# Patient Record
Sex: FEMALE | Race: WHITE | NOT HISPANIC OR LATINO | Employment: OTHER
[De-identification: names, ages, dates, MRNs, and addresses within clinical notes are randomized per-mention and may not be internally consistent; named-entity substitution may affect disease eponyms.]

---

## 2017-04-21 ENCOUNTER — SURGERY (OUTPATIENT)
Age: 67
End: 2017-04-21

## 2017-04-21 ENCOUNTER — HOSPITAL ENCOUNTER (OUTPATIENT)
Facility: CLINIC | Age: 67
Discharge: HOME | End: 2017-04-21
Attending: COLON & RECTAL SURGERY | Admitting: COLON & RECTAL SURGERY
Payer: COMMERCIAL

## 2017-04-21 VITALS
DIASTOLIC BLOOD PRESSURE: 70 MMHG | OXYGEN SATURATION: 98 % | WEIGHT: 230 LBS | HEIGHT: 64 IN | SYSTOLIC BLOOD PRESSURE: 137 MMHG | BODY MASS INDEX: 39.27 KG/M2 | RESPIRATION RATE: 6 BRPM

## 2017-04-21 LAB — COLONOSCOPY: NORMAL

## 2017-04-21 PROCEDURE — 99153 MOD SED SAME PHYS/QHP EA: CPT

## 2017-04-21 PROCEDURE — 27210582 ZZH DEVICE CLIP RESOLUTION, EACH: Performed by: COLON & RECTAL SURGERY

## 2017-04-21 PROCEDURE — 25000125 ZZHC RX 250: Performed by: COLON & RECTAL SURGERY

## 2017-04-21 PROCEDURE — 88305 TISSUE EXAM BY PATHOLOGIST: CPT | Mod: 26 | Performed by: COLON & RECTAL SURGERY

## 2017-04-21 PROCEDURE — 25000128 H RX IP 250 OP 636: Performed by: COLON & RECTAL SURGERY

## 2017-04-21 PROCEDURE — 88305 TISSUE EXAM BY PATHOLOGIST: CPT | Performed by: COLON & RECTAL SURGERY

## 2017-04-21 PROCEDURE — 45380 COLONOSCOPY AND BIOPSY: CPT | Mod: PT,XU | Performed by: COLON & RECTAL SURGERY

## 2017-04-21 PROCEDURE — 45385 COLONOSCOPY W/LESION REMOVAL: CPT | Mod: PT | Performed by: COLON & RECTAL SURGERY

## 2017-04-21 PROCEDURE — G0500 MOD SEDAT ENDO SERVICE >5YRS: HCPCS | Performed by: COLON & RECTAL SURGERY

## 2017-04-21 RX ORDER — FLUMAZENIL 0.1 MG/ML
0.2 INJECTION, SOLUTION INTRAVENOUS
Status: DISCONTINUED | OUTPATIENT
Start: 2017-04-21 | End: 2017-04-21 | Stop reason: HOSPADM

## 2017-04-21 RX ORDER — METFORMIN HCL 500 MG
500 TABLET, EXTENDED RELEASE 24 HR ORAL
COMMUNITY
End: 2023-02-13

## 2017-04-21 RX ORDER — LIDOCAINE 40 MG/G
CREAM TOPICAL
Status: DISCONTINUED | OUTPATIENT
Start: 2017-04-21 | End: 2017-04-21 | Stop reason: HOSPADM

## 2017-04-21 RX ORDER — ONDANSETRON 2 MG/ML
4 INJECTION INTRAMUSCULAR; INTRAVENOUS
Status: DISCONTINUED | OUTPATIENT
Start: 2017-04-21 | End: 2017-04-21 | Stop reason: HOSPADM

## 2017-04-21 RX ORDER — FENTANYL CITRATE 50 UG/ML
INJECTION, SOLUTION INTRAMUSCULAR; INTRAVENOUS PRN
Status: DISCONTINUED | OUTPATIENT
Start: 2017-04-21 | End: 2017-04-21 | Stop reason: HOSPADM

## 2017-04-21 RX ORDER — NALOXONE HYDROCHLORIDE 0.4 MG/ML
.1-.4 INJECTION, SOLUTION INTRAMUSCULAR; INTRAVENOUS; SUBCUTANEOUS
Status: DISCONTINUED | OUTPATIENT
Start: 2017-04-21 | End: 2017-04-21 | Stop reason: HOSPADM

## 2017-04-21 RX ORDER — ONDANSETRON 4 MG/1
4 TABLET, ORALLY DISINTEGRATING ORAL EVERY 6 HOURS PRN
Status: DISCONTINUED | OUTPATIENT
Start: 2017-04-21 | End: 2017-04-21 | Stop reason: HOSPADM

## 2017-04-21 RX ORDER — ONDANSETRON 2 MG/ML
4 INJECTION INTRAMUSCULAR; INTRAVENOUS EVERY 6 HOURS PRN
Status: DISCONTINUED | OUTPATIENT
Start: 2017-04-21 | End: 2017-04-21 | Stop reason: HOSPADM

## 2017-04-21 RX ADMIN — MIDAZOLAM HYDROCHLORIDE 1 MG: 1 INJECTION, SOLUTION INTRAMUSCULAR; INTRAVENOUS at 14:13

## 2017-04-21 RX ADMIN — FENTANYL CITRATE 100 MCG: 50 INJECTION, SOLUTION INTRAMUSCULAR; INTRAVENOUS at 13:54

## 2017-04-21 RX ADMIN — MIDAZOLAM HYDROCHLORIDE 2 MG: 1 INJECTION, SOLUTION INTRAMUSCULAR; INTRAVENOUS at 13:54

## 2017-04-21 RX ADMIN — FENTANYL CITRATE 50 MCG: 50 INJECTION, SOLUTION INTRAMUSCULAR; INTRAVENOUS at 14:13

## 2017-04-21 RX ADMIN — MIDAZOLAM HYDROCHLORIDE 1 MG: 1 INJECTION, SOLUTION INTRAMUSCULAR; INTRAVENOUS at 14:00

## 2017-04-21 NOTE — H&P
"Pre-Endoscopy History and Physical     Micki Mathew MRN# [id number]   YOB: 1950 Age: 67 year old     Date of Procedure: 4/21/2017  Primary care provider: Shania Vann  Type of Endoscopy: Colonoscopy  Reason for Procedure: History of polyps  Type of Anesthesia Anticipated: Moderate Sedation    HPI:    Micki is a 67 year old female who will be undergoing the above procedure.      A history and physical has been performed. The patient's medications and allergies have been reviewed. The risks and benefits of the procedure and the sedation options and risks were discussed with the patient.  All questions were answered and informed consent was obtained.      She denies a personal or family history of anesthesia complications or bleeding disorders.     No Known Allergies       No current facility-administered medications on file prior to encounter.   No current outpatient prescriptions on file prior to encounter.    There is no problem list on file for this patient.       Past Medical History:   Diagnosis Date     Atrial fibrillation (H)      Diabetes (H)     MD type 2     Thyroid disease         Past Surgical History:   Procedure Laterality Date     ENT SURGERY      benign lump throat     ENT SURGERY      tonsils     GYN SURGERY      c section     ORTHOPEDIC SURGERY      left hip replacement       Social History   Substance Use Topics     Smoking status: Never Smoker     Smokeless tobacco: Not on file     Alcohol use No       History reviewed. No pertinent family history.    REVIEW OF SYSTEMS:     5 point ROS negative except as noted above in HPI, including Gen., Resp., CV, GI &  system review.      PHYSICAL EXAM:   BP (!) 177/92  Resp 16  Ht 1.626 m (5' 4\")  Wt 104.3 kg (230 lb)  SpO2 97%  BMI 39.48 kg/m2 Estimated body mass index is 39.48 kg/(m^2) as calculated from the following:    Height as of this encounter: 1.626 m (5' 4\").    Weight as of this encounter: 104.3 kg (230 lb).   GENERAL " APPEARANCE: healthy and alert  MENTAL STATUS: alert  RESP: lungs clear to auscultation - no rales, rhonchi or wheezes  CV: regular rates and rhythm and normal S1 S2, no S3 or S4      IMPRESSION   ASA Class 2 - Mild systemic disease        PLAN:     Plan for colonoscopy. We discussed the risks, benefits and alternatives and the patient wished to proceed.    The above has been forwarded to the consulting provider.      Peng Harding MD  [facility]  Phone: [phone]  April 21, 2017  3

## 2017-04-24 LAB — COPATH REPORT: NORMAL

## 2018-08-16 ENCOUNTER — SURGERY (OUTPATIENT)
Age: 68
End: 2018-08-16

## 2018-08-16 ENCOUNTER — HOSPITAL ENCOUNTER (OUTPATIENT)
Facility: CLINIC | Age: 68
Discharge: HOME | End: 2018-08-16
Attending: COLON & RECTAL SURGERY | Admitting: COLON & RECTAL SURGERY
Payer: MEDICARE

## 2018-08-16 VITALS
SYSTOLIC BLOOD PRESSURE: 169 MMHG | BODY MASS INDEX: 39.15 KG/M2 | RESPIRATION RATE: 18 BRPM | HEART RATE: 65 BPM | WEIGHT: 235 LBS | DIASTOLIC BLOOD PRESSURE: 90 MMHG | OXYGEN SATURATION: 94 % | HEIGHT: 65 IN

## 2018-08-16 DIAGNOSIS — I48.0 PAROXYSMAL ATRIAL FIBRILLATION (H): Primary | ICD-10-CM

## 2018-08-16 LAB — COLONOSCOPY: NORMAL

## 2018-08-16 PROCEDURE — G0500 MOD SEDAT ENDO SERVICE >5YRS: HCPCS | Performed by: COLON & RECTAL SURGERY

## 2018-08-16 PROCEDURE — 25000128 H RX IP 250 OP 636: Performed by: COLON & RECTAL SURGERY

## 2018-08-16 PROCEDURE — 88305 TISSUE EXAM BY PATHOLOGIST: CPT | Performed by: COLON & RECTAL SURGERY

## 2018-08-16 PROCEDURE — 45385 COLONOSCOPY W/LESION REMOVAL: CPT | Performed by: COLON & RECTAL SURGERY

## 2018-08-16 PROCEDURE — 45380 COLONOSCOPY AND BIOPSY: CPT | Performed by: COLON & RECTAL SURGERY

## 2018-08-16 PROCEDURE — 99153 MOD SED SAME PHYS/QHP EA: CPT | Performed by: COLON & RECTAL SURGERY

## 2018-08-16 PROCEDURE — 88305 TISSUE EXAM BY PATHOLOGIST: CPT | Mod: 26 | Performed by: COLON & RECTAL SURGERY

## 2018-08-16 RX ORDER — SODIUM PHOSPHATE,MONO-DIBASIC 19G-7G/118
1 ENEMA (ML) RECTAL DAILY
COMMUNITY

## 2018-08-16 RX ORDER — ONDANSETRON 2 MG/ML
4 INJECTION INTRAMUSCULAR; INTRAVENOUS
Status: DISCONTINUED | OUTPATIENT
Start: 2018-08-16 | End: 2018-08-16 | Stop reason: HOSPADM

## 2018-08-16 RX ORDER — MULTIVITAMIN WITH IRON
1 TABLET ORAL DAILY
COMMUNITY

## 2018-08-16 RX ORDER — LIDOCAINE 40 MG/G
CREAM TOPICAL
Status: DISCONTINUED | OUTPATIENT
Start: 2018-08-16 | End: 2018-08-16 | Stop reason: HOSPADM

## 2018-08-16 RX ORDER — FLUMAZENIL 0.1 MG/ML
0.2 INJECTION, SOLUTION INTRAVENOUS
Status: CANCELLED | OUTPATIENT
Start: 2018-08-16 | End: 2018-08-16

## 2018-08-16 RX ORDER — FENTANYL CITRATE 50 UG/ML
INJECTION, SOLUTION INTRAMUSCULAR; INTRAVENOUS PRN
Status: DISCONTINUED | OUTPATIENT
Start: 2018-08-16 | End: 2018-08-16 | Stop reason: HOSPADM

## 2018-08-16 RX ORDER — NALOXONE HYDROCHLORIDE 0.4 MG/ML
.1-.4 INJECTION, SOLUTION INTRAMUSCULAR; INTRAVENOUS; SUBCUTANEOUS
Status: CANCELLED | OUTPATIENT
Start: 2018-08-16 | End: 2018-08-17

## 2018-08-16 RX ORDER — ONDANSETRON 4 MG/1
4 TABLET, ORALLY DISINTEGRATING ORAL EVERY 6 HOURS PRN
Status: CANCELLED | OUTPATIENT
Start: 2018-08-16

## 2018-08-16 RX ORDER — ONDANSETRON 2 MG/ML
4 INJECTION INTRAMUSCULAR; INTRAVENOUS EVERY 6 HOURS PRN
Status: CANCELLED | OUTPATIENT
Start: 2018-08-16

## 2018-08-16 RX ORDER — MULTIPLE VITAMINS W/ MINERALS TAB 9MG-400MCG
1 TAB ORAL DAILY
COMMUNITY

## 2018-08-16 RX ORDER — CYANOCOBALAMIN (VITAMIN B-12) 500 MCG
400 LOZENGE ORAL DAILY
COMMUNITY

## 2018-08-16 RX ADMIN — MIDAZOLAM 1 MG: 1 INJECTION INTRAMUSCULAR; INTRAVENOUS at 10:26

## 2018-08-16 RX ADMIN — FENTANYL CITRATE 100 MCG: 50 INJECTION, SOLUTION INTRAMUSCULAR; INTRAVENOUS at 10:12

## 2018-08-16 RX ADMIN — FENTANYL CITRATE 50 MCG: 50 INJECTION, SOLUTION INTRAMUSCULAR; INTRAVENOUS at 10:28

## 2018-08-16 RX ADMIN — MIDAZOLAM 1 MG: 1 INJECTION INTRAMUSCULAR; INTRAVENOUS at 10:37

## 2018-08-16 RX ADMIN — FENTANYL CITRATE 50 MCG: 50 INJECTION, SOLUTION INTRAMUSCULAR; INTRAVENOUS at 10:39

## 2018-08-16 RX ADMIN — MIDAZOLAM 2 MG: 1 INJECTION INTRAMUSCULAR; INTRAVENOUS at 10:13

## 2018-08-16 NOTE — H&P
Pre-Endoscopy History and Physical     Micki Mathew MRN# [id number]   YOB: 1950 Age: 68 year old     Date of Procedure: 8/16/2018  Primary care provider: Shania Vann  Type of Endoscopy: Colonoscopy  Reason for Procedure: History of polyps  Type of Anesthesia Anticipated: Moderate Sedation    HPI:    Micki is a 68 year old female who will be undergoing the above procedure.      A history and physical has been performed. The patient's medications and allergies have been reviewed. The risks and benefits of the procedure and the sedation options and risks were discussed with the patient.  All questions were answered and informed consent was obtained.      She denies a personal or family history of anesthesia complications or bleeding disorders.     No Known Allergies       No current facility-administered medications on file prior to encounter.   Current Outpatient Prescriptions on File Prior to Encounter:  metFORMIN (GLUCOPHAGE-XR) 500 MG 24 hr tablet Take 500 mg by mouth daily (with dinner)   Rivaroxaban (XARELTO PO) Take 20 mg by mouth       There is no problem list on file for this patient.       Past Medical History:   Diagnosis Date     Atrial fibrillation (H)      Diabetes (H)     MD type 2     Thyroid disease         Past Surgical History:   Procedure Laterality Date     COLONOSCOPY N/A 4/21/2017    Procedure: COMBINED COLONOSCOPY, SINGLE OR MULTIPLE BIOPSY/POLYPECTOMY BY BIOPSY;;  Surgeon: Peng Harding MD;  Location:  GI     ENT SURGERY      benign lump throat     ENT SURGERY      tonsils     GYN SURGERY      c section     ORTHOPEDIC SURGERY      left hip replacement       Social History   Substance Use Topics     Smoking status: Never Smoker     Smokeless tobacco: Not on file     Alcohol use No       No family history on file.    REVIEW OF SYSTEMS:     5 point ROS negative except as noted above in HPI, including Gen., Resp., CV, GI &  system review.      PHYSICAL EXAM:   There  "were no vitals taken for this visit. Estimated body mass index is 39.48 kg/(m^2) as calculated from the following:    Height as of 4/21/17: 1.626 m (5' 4\").    Weight as of 4/21/17: 104.3 kg (230 lb).   GENERAL APPEARANCE: healthy and alert  MENTAL STATUS: alert  RESP: lungs clear to auscultation - no rales, rhonchi or wheezes  CV: regular rates and rhythm and normal S1 S2, no S3 or S4      IMPRESSION   ASA Class 2 - Mild systemic disease        PLAN:     Plan for colonoscopy. We discussed the risks, benefits and alternatives and the patient wished to proceed.    The above has been forwarded to the consulting provider.      Peng Harding MD  [facility]  Phone: [phone]  August 16, 2018    "

## 2018-08-17 LAB — COPATH REPORT: NORMAL

## 2019-05-29 ENCOUNTER — HOSPITAL ENCOUNTER (OUTPATIENT)
Facility: CLINIC | Age: 69
End: 2019-05-29
Attending: COLON & RECTAL SURGERY | Admitting: COLON & RECTAL SURGERY

## 2019-07-16 RX ORDER — ONDANSETRON 2 MG/ML
4 INJECTION INTRAMUSCULAR; INTRAVENOUS
Status: CANCELLED | OUTPATIENT
Start: 2019-07-16

## 2019-07-16 RX ORDER — LIDOCAINE 40 MG/G
CREAM TOPICAL
Status: CANCELLED | OUTPATIENT
Start: 2019-07-16

## 2023-02-13 ENCOUNTER — VIRTUAL VISIT (OUTPATIENT)
Dept: FAMILY MEDICINE | Facility: CLINIC | Age: 73
End: 2023-02-13
Payer: COMMERCIAL

## 2023-02-13 DIAGNOSIS — R06.2 WHEEZING: ICD-10-CM

## 2023-02-13 DIAGNOSIS — J20.9 ACUTE BRONCHITIS, UNSPECIFIED ORGANISM: Primary | ICD-10-CM

## 2023-02-13 PROCEDURE — 99203 OFFICE O/P NEW LOW 30 MIN: CPT | Mod: 95 | Performed by: INTERNAL MEDICINE

## 2023-02-13 RX ORDER — PREDNISONE 20 MG/1
40 TABLET ORAL DAILY
Qty: 10 TABLET | Refills: 0 | Status: SHIPPED | OUTPATIENT
Start: 2023-02-13 | End: 2023-02-18

## 2023-02-13 NOTE — PROGRESS NOTES
Micki is a 73 year old who is being evaluated via a billable telephone visit.      What phone number would you like to be contacted at? [phone]   How would you like to obtain your AVS? Mail a copy    Distant Location (provider location):  Off-site    Assessment & Plan     Acute bronchitis, unspecified organism  Has been having ongoing cough for the last 2 weeks  Initially started with some cold symptoms  Now having cough  Coughing all the time  Bringing up yellow sputum  Also feels congested  Having nasal drainage  Which is yellow in color  Also having some ear pain  No body aches  She had bronchitis in the past and feels like one  - amoxicillin-clavulanate (AUGMENTIN) 875-125 MG tablet; Take 1 tablet by mouth 2 times daily    Wheezing   she does have some wheezing increasing shortness  I think she is bronchospastic  Try a short course of steroids  - predniSONE (DELTASONE) 20 MG tablet; Take 2 tablets (40 mg) by mouth daily for 5 days      30 minutes spent on the date of the encounter doing chart review, history and exam, documentation and further activities per the note           Return in about 4 weeks (around 3/13/2023), or if symptoms worsen or fail to improve.    Mario Flores MD  [facility]    Grazyna Sweet is a 73 year old, presenting for the following health issues:  Cough      History of Present Illness       Reason for visit:  COUGH  Symptom onset:  1-2 weeks ago  Symptoms include:  Cough, chest pain, sore throat, ear pain, runny nose, watery eyes, headache, tired  Symptom intensity:  Severe  Symptom progression:  Staying the same  Had these symptoms before:  Yes  Has tried/received treatment for these symptoms:  Yes  Previous treatment was successful:  Yes  Prior treatment description:  Antibotics  What makes it worse:  None  What makes it better:  Drink fluids, rest    She eats 0-1 servings of fruits and vegetables daily.She consumes 0 sweetened beverage(s)  daily.She exercises with enough effort to increase her heart rate 9 or less minutes per day.  She exercises with enough effort to increase her heart rate 3 or less days per week.   She is taking medications regularly.             Review of Systems   Constitutional, HEENT, cardiovascular, pulmonary, gi and gu systems are negative, except as otherwise noted.      Objective           Vitals:  No vitals were obtained today due to virtual visit.    Physical Exam   healthy, alert and no distress  PSYCH: Alert and oriented times 3; coherent speech, normal   rate and volume, able to articulate logical thoughts, able   to abstract reason, no tangential thoughts, no hallucinations   or delusions  Her affect is normal  RESP: No cough, no audible wheezing, able to talk in full sentences  Remainder of exam unable to be completed due to telephone visits                Phone call duration: 13 minutes

## 2023-04-04 ENCOUNTER — APPOINTMENT (OUTPATIENT)
Dept: CT IMAGING | Facility: CLINIC | Age: 73
End: 2023-04-04
Attending: NURSE PRACTITIONER
Payer: COMMERCIAL

## 2023-04-04 ENCOUNTER — HOSPITAL ENCOUNTER (OUTPATIENT)
Facility: CLINIC | Age: 73
Setting detail: OBSERVATION
Discharge: HOME | End: 2023-04-05
Attending: NURSE PRACTITIONER | Admitting: INTERNAL MEDICINE
Payer: COMMERCIAL

## 2023-04-04 DIAGNOSIS — S09.90XA HEAD INJURY, INITIAL ENCOUNTER: ICD-10-CM

## 2023-04-04 DIAGNOSIS — Z79.01 LONG TERM (CURRENT) USE OF ANTICOAGULANTS: ICD-10-CM

## 2023-04-04 DIAGNOSIS — I60.9 SUBARACHNOID HEMORRHAGE (H): ICD-10-CM

## 2023-04-04 DIAGNOSIS — S09.93XA FACIAL TRAUMA, INITIAL ENCOUNTER: ICD-10-CM

## 2023-04-04 DIAGNOSIS — S06.6X0A SUBARACHNOID HEMORRHAGE FOLLOWING INJURY, NO LOSS OF CONSCIOUSNESS, INITIAL ENCOUNTER (H): ICD-10-CM

## 2023-04-04 DIAGNOSIS — I48.0 PAROXYSMAL ATRIAL FIBRILLATION (H): ICD-10-CM

## 2023-04-04 DIAGNOSIS — R04.0 EPISTAXIS: ICD-10-CM
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
## 2023-04-04 LAB
ANION GAP SERPL CALCULATED.3IONS-SCNC: 13 MMOL/L (ref 7–15)
BASOPHILS # BLD AUTO: 0.1 10E3/UL (ref 0–0.2)
BASOPHILS NFR BLD AUTO: 1 %
BUN SERPL-MCNC: 19.5 MG/DL (ref 8–23)
CALCIUM SERPL-MCNC: 10.2 MG/DL (ref 8.8–10.2)
CHLORIDE SERPL-SCNC: 97 MMOL/L (ref 98–107)
CREAT SERPL-MCNC: 0.97 MG/DL (ref 0.51–0.95)
DEPRECATED HCO3 PLAS-SCNC: 29 MMOL/L (ref 22–29)
EOSINOPHIL # BLD AUTO: 0.1 10E3/UL (ref 0–0.7)
EOSINOPHIL NFR BLD AUTO: 1 %
ERYTHROCYTE [DISTWIDTH] IN BLOOD BY AUTOMATED COUNT: 13 % (ref 10–15)
GFR SERPL CREATININE-BSD FRML MDRD: 61 ML/MIN/1.73M2
GLUCOSE SERPL-MCNC: 113 MG/DL (ref 70–99)
HCT VFR BLD AUTO: 41.6 % (ref 35–47)
HGB BLD-MCNC: 13.8 G/DL (ref 11.7–15.7)
HOLD SPECIMEN: NORMAL
IMM GRANULOCYTES # BLD: 0.1 10E3/UL
IMM GRANULOCYTES NFR BLD: 0 %
INR PPP: 1.17 (ref 0.85–1.15)
LYMPHOCYTES # BLD AUTO: 1.3 10E3/UL (ref 0.8–5.3)
LYMPHOCYTES NFR BLD AUTO: 10 %
MCH RBC QN AUTO: 30.6 PG (ref 26.5–33)
MCHC RBC AUTO-ENTMCNC: 33.2 G/DL (ref 31.5–36.5)
MCV RBC AUTO: 92 FL (ref 78–100)
MONOCYTES # BLD AUTO: 1 10E3/UL (ref 0–1.3)
MONOCYTES NFR BLD AUTO: 8 %
NEUTROPHILS # BLD AUTO: 10.1 10E3/UL (ref 1.6–8.3)
NEUTROPHILS NFR BLD AUTO: 80 %
NRBC # BLD AUTO: 0 10E3/UL
NRBC BLD AUTO-RTO: 0 /100
PLATELET # BLD AUTO: 240 10E3/UL (ref 150–450)
POTASSIUM SERPL-SCNC: 3.3 MMOL/L (ref 3.4–5.3)
RBC # BLD AUTO: 4.51 10E6/UL (ref 3.8–5.2)
SODIUM SERPL-SCNC: 139 MMOL/L (ref 136–145)
WBC # BLD AUTO: 12.5 10E3/UL (ref 4–11)

## 2023-04-04 PROCEDURE — 99285 EMERGENCY DEPT VISIT HI MDM: CPT | Performed by: NURSE PRACTITIONER

## 2023-04-04 PROCEDURE — 250N000013 HC RX MED GY IP 250 OP 250 PS 637: Performed by: NURSE PRACTITIONER

## 2023-04-04 PROCEDURE — 80048 BASIC METABOLIC PNL TOTAL CA: CPT | Performed by: NURSE PRACTITIONER

## 2023-04-04 PROCEDURE — 99285 EMERGENCY DEPT VISIT HI MDM: CPT | Mod: 25 | Performed by: NURSE PRACTITIONER

## 2023-04-04 PROCEDURE — 85025 COMPLETE CBC W/AUTO DIFF WBC: CPT | Performed by: NURSE PRACTITIONER

## 2023-04-04 PROCEDURE — 99222 1ST HOSP IP/OBS MODERATE 55: CPT | Mod: AI | Performed by: INTERNAL MEDICINE

## 2023-04-04 PROCEDURE — 36415 COLL VENOUS BLD VENIPUNCTURE: CPT | Performed by: NURSE PRACTITIONER

## 2023-04-04 PROCEDURE — 70486 CT MAXILLOFACIAL W/O DYE: CPT

## 2023-04-04 PROCEDURE — G0378 HOSPITAL OBSERVATION PER HR: HCPCS

## 2023-04-04 PROCEDURE — 250N000013 HC RX MED GY IP 250 OP 250 PS 637: Performed by: INTERNAL MEDICINE

## 2023-04-04 PROCEDURE — 85610 PROTHROMBIN TIME: CPT | Performed by: NURSE PRACTITIONER

## 2023-04-04 PROCEDURE — 70450 CT HEAD/BRAIN W/O DYE: CPT | Mod: 76

## 2023-04-04 PROCEDURE — 70450 CT HEAD/BRAIN W/O DYE: CPT

## 2023-04-04 RX ORDER — OXYCODONE HYDROCHLORIDE 5 MG/1
5 TABLET ORAL EVERY 4 HOURS PRN
Status: DISCONTINUED | OUTPATIENT
Start: 2023-04-04 | End: 2023-04-05 | Stop reason: HOSPADM

## 2023-04-04 RX ORDER — ATORVASTATIN CALCIUM 20 MG/1
20 TABLET, FILM COATED ORAL DAILY
Status: DISCONTINUED | OUTPATIENT
Start: 2023-04-05 | End: 2023-04-05 | Stop reason: HOSPADM

## 2023-04-04 RX ORDER — ACETAMINOPHEN 650 MG/1
650 SUPPOSITORY RECTAL EVERY 4 HOURS PRN
Status: DISCONTINUED | OUTPATIENT
Start: 2023-04-04 | End: 2023-04-05 | Stop reason: HOSPADM

## 2023-04-04 RX ORDER — HYDROCHLOROTHIAZIDE 25 MG/1
1 TABLET ORAL
COMMUNITY
Start: 2023-01-31

## 2023-04-04 RX ORDER — ASPIRIN 81 MG/1
81 TABLET ORAL DAILY
Status: DISCONTINUED | OUTPATIENT
Start: 2023-04-05 | End: 2023-04-05 | Stop reason: HOSPADM

## 2023-04-04 RX ORDER — LEVOTHYROXINE SODIUM 75 UG/1
75 TABLET ORAL DAILY
COMMUNITY
Start: 2023-01-31

## 2023-04-04 RX ORDER — PROCHLORPERAZINE 25 MG
12.5 SUPPOSITORY, RECTAL RECTAL EVERY 12 HOURS PRN
Status: DISCONTINUED | OUTPATIENT
Start: 2023-04-04 | End: 2023-04-05 | Stop reason: HOSPADM

## 2023-04-04 RX ORDER — ACETAMINOPHEN 500 MG
1000 TABLET ORAL ONCE
Status: COMPLETED | OUTPATIENT
Start: 2023-04-04 | End: 2023-04-04

## 2023-04-04 RX ORDER — OXYCODONE HYDROCHLORIDE 5 MG/1
5 TABLET ORAL ONCE
Status: COMPLETED | OUTPATIENT
Start: 2023-04-04 | End: 2023-04-04

## 2023-04-04 RX ORDER — FAMOTIDINE 20 MG/1
20 TABLET, FILM COATED ORAL 2 TIMES DAILY
Status: DISCONTINUED | OUTPATIENT
Start: 2023-04-04 | End: 2023-04-05 | Stop reason: HOSPADM

## 2023-04-04 RX ORDER — METOPROLOL TARTRATE 25 MG/1
25 TABLET, FILM COATED ORAL 2 TIMES DAILY
COMMUNITY

## 2023-04-04 RX ORDER — ATORVASTATIN CALCIUM 20 MG/1
20 TABLET, FILM COATED ORAL DAILY
COMMUNITY
Start: 2023-01-31 | End: 2024-01-31

## 2023-04-04 RX ORDER — HYDROCHLOROTHIAZIDE 25 MG/1
25 TABLET ORAL
Status: DISCONTINUED | OUTPATIENT
Start: 2023-04-04 | End: 2023-04-05 | Stop reason: HOSPADM

## 2023-04-04 RX ORDER — METOPROLOL TARTRATE 50 MG
50 TABLET ORAL 2 TIMES DAILY
COMMUNITY
Start: 2023-01-31

## 2023-04-04 RX ORDER — PROCHLORPERAZINE MALEATE 5 MG
5 TABLET ORAL EVERY 6 HOURS PRN
Status: DISCONTINUED | OUTPATIENT
Start: 2023-04-04 | End: 2023-04-05 | Stop reason: HOSPADM

## 2023-04-04 RX ORDER — METOPROLOL TARTRATE 25 MG/1
75 TABLET, FILM COATED ORAL 2 TIMES DAILY
Status: DISCONTINUED | OUTPATIENT
Start: 2023-04-04 | End: 2023-04-05 | Stop reason: HOSPADM

## 2023-04-04 RX ORDER — LEVOTHYROXINE SODIUM 75 UG/1
75 TABLET ORAL DAILY
Status: DISCONTINUED | OUTPATIENT
Start: 2023-04-05 | End: 2023-04-05 | Stop reason: HOSPADM

## 2023-04-04 RX ADMIN — METOPROLOL TARTRATE 75 MG: 25 TABLET, FILM COATED ORAL at 21:13

## 2023-04-04 RX ADMIN — OXYCODONE 5 MG: 5 TABLET ORAL at 21:24

## 2023-04-04 RX ADMIN — HYDROCHLOROTHIAZIDE 25 MG: 25 TABLET ORAL at 21:14

## 2023-04-04 RX ADMIN — ACETAMINOPHEN 1000 MG: 500 TABLET ORAL at 12:32

## 2023-04-04 RX ADMIN — SERTRALINE HYDROCHLORIDE 50 MG: 50 TABLET ORAL at 21:14

## 2023-04-04 RX ADMIN — OXYCODONE 5 MG: 5 TABLET ORAL at 12:32

## 2023-04-04 ASSESSMENT — ACTIVITIES OF DAILY LIVING (ADL)
ADLS_ACUITY_SCORE: 35
ADLS_ACUITY_SCORE: 31
ADLS_ACUITY_SCORE: 35
ADLS_ACUITY_SCORE: 31
ADLS_ACUITY_SCORE: 35
ADLS_ACUITY_SCORE: 31

## 2023-04-04 NOTE — ED NOTES
"[facility]   Admission Handoff    The patient is Micki Mathew, 73 year old who arrived in the ED by AMBULANCE from home with a complaint of Fall  . The patient's current symptoms are new and during this time the symptoms have remained the same. In the ED, patient was diagnosed with   Final diagnoses:   Facial trauma, initial encounter   Epistaxis   Subarachnoid hemorrhage (H)   Head injury, initial encounter         Needed?: No    Allergies:    Allergies   Allergen Reactions    Lisinopril Cough       Past Medical Hx:   Past Medical History:   Diagnosis Date    Atrial fibrillation (H)     Diabetes (H)     MD type 2    Thyroid disease        Initial vitals were: BP: (!) 159/93  Pulse: 65  Temp: 97.3  F (36.3  C)  Resp: 16  Height: 162.6 cm (5' 4\")  Weight: 104.3 kg (230 lb)  SpO2: 98 %   Recent vital Signs: BP (!) 161/86   Pulse 59   Temp 97.3  F (36.3  C) (Oral)   Resp 16   Ht 1.626 m (5' 4\")   Wt 104.3 kg (230 lb)   SpO2 96%   BMI 39.48 kg/m      Elimination Status: Continent: Yes     Activity Level: SBA    Fall Status: Reason for falls risk: Other- Here for fall.   bed/chair alarm on, nonskid shoes/slippers when out of bed, arm band in place, patient and family education, assistive device/personal items within reach, and activity supervised    Baseline Mental status: WDL  Current Mental Status changes: at basesline    Infection present or suspected this encounter: no  Sepsis suspected: No    Isolation type: NA    Bariatric equipment needed?: No    In the ED these meds were given:   Medications   famotidine (PEPCID) tablet 20 mg (has no administration in time range)   acetaminophen (TYLENOL) Suppository 650 mg (has no administration in time range)   prochlorperazine (COMPAZINE) injection 5 mg (has no administration in time range)     Or   prochlorperazine (COMPAZINE) tablet 5 mg (has no administration in time range)     Or   prochlorperazine (COMPAZINE) suppository " 12.5 mg (has no administration in time range)   acetaminophen (TYLENOL) tablet 1,000 mg (1,000 mg Oral $Given 4/4/23 1232)   oxyCODONE (ROXICODONE) tablet 5 mg (5 mg Oral $Given 4/4/23 1232)       Drips running?  No    Home pump  No    Current LDAs: None. Pt refused.     Results:   Labs/Imaging  Ordered and Resulted from Time of ED Arrival Up to the Time of Departure from the ED  Results for orders placed or performed during the hospital encounter of 04/04/23 (from the past 24 hour(s))   CBC with platelets differential    Narrative    The following orders were created for panel order CBC with platelets differential.  Procedure                               Abnormality         Status                     ---------                               -----------         ------                     CBC with platelets and d...[[id number]]  Abnormal            Final result                 Please view results for these tests on the individual orders.   INR   Result Value Ref Range    INR 1.17 (H) 0.85 - 1.15   Basic metabolic panel   Result Value Ref Range    Sodium 139 136 - 145 mmol/L    Potassium 3.3 (L) 3.4 - 5.3 mmol/L    Chloride 97 (L) 98 - 107 mmol/L    Carbon Dioxide (CO2) 29 22 - 29 mmol/L    Anion Gap 13 7 - 15 mmol/L    Urea Nitrogen 19.5 8.0 - 23.0 mg/dL    Creatinine 0.97 (H) 0.51 - 0.95 mg/dL    Calcium 10.2 8.8 - 10.2 mg/dL    Glucose 113 (H) 70 - 99 mg/dL    GFR Estimate 61 >60 mL/min/1.73m2   CBC with platelets and differential   Result Value Ref Range    WBC Count 12.5 (H) 4.0 - 11.0 10e3/uL    RBC Count 4.51 3.80 - 5.20 10e6/uL    Hemoglobin 13.8 11.7 - 15.7 g/dL    Hematocrit 41.6 35.0 - 47.0 %    MCV 92 78 - 100 fL    MCH 30.6 26.5 - 33.0 pg    MCHC 33.2 31.5 - 36.5 g/dL    RDW 13.0 10.0 - 15.0 %    Platelet Count 240 150 - 450 10e3/uL    % Neutrophils 80 %    % Lymphocytes 10 %    % Monocytes 8 %    % Eosinophils 1 %    % Basophils 1 %    % Immature Granulocytes 0 %    NRBCs per 100 WBC 0 <1 /100    Absolute  Neutrophils 10.1 (H) 1.6 - 8.3 10e3/uL    Absolute Lymphocytes 1.3 0.8 - 5.3 10e3/uL    Absolute Monocytes 1.0 0.0 - 1.3 10e3/uL    Absolute Eosinophils 0.1 0.0 - 0.7 10e3/uL    Absolute Basophils 0.1 0.0 - 0.2 10e3/uL    Absolute Immature Granulocytes 0.1 <=0.4 10e3/uL    Absolute NRBCs 0.0 10e3/uL   Extra Tube    Narrative    The following orders were created for panel order Extra Tube.  Procedure                               Abnormality         Status                     ---------                               -----------         ------                     Extra Red Top Tube[[id number]]                               Final result                 Please view results for these tests on the individual orders.   Extra Red Top Tube   Result Value Ref Range    Hold Specimen JIC    CT Head w/o Contrast    Narrative    CT SCAN OF THE HEAD WITHOUT CONTRAST April 4, 2023 12:59 PM     HISTORY: Fall, on Xarelto.    TECHNIQUE: Axial images of the head and coronal reformations without  IV contrast material. Radiation dose for this scan was reduced using  automated exposure control, adjustment of the mA and/or kV according  to patient size, or iterative reconstruction technique.    COMPARISON: None.    FINDINGS: Subtle sulcal hyperattenuation involving the left precentral  sulcus (series 2 image 20). Punctate area of hyperattenuation within  or along the left sylvian fissure (series 2 image 9).    Well-defined right parietal lobe infarct, age indeterminate. Smaller  right cerebellar infarct, age indeterminate. Volume loss and patchy  nonspecific white matter hypoattenuation which likely represents  chronic small vessel ischemic change. Acute nasal bone fracture. Refer  to maxillofacial CT report. Air-fluid levels within the paranasal  sinuses.      Impression    IMPRESSION:   1. Subtle sulcal hyperattenuation involving the left precentral sulcus  which could represent subtle acute subarachnoid. Recommend follow-up  head CT.  2.  Punctate area of hyperattenuation within the left sylvian fissure  (series 2 image 9). Subtle subarachnoid hemorrhage not completely  excluded. Recommend attention on follow-up head CT.  3. Infarcts involving the right parietal lobe and right cerebellar  hemisphere, age indeterminate (MRI could be performed).  4. Acute nasal bone fracture. Refer to maxillofacial CT report.    SUELLEN REYES MD         SYSTEM ID:  [id number]   CT Facial Bones without Contrast    Narrative    CT FACIAL BONES WITHOUT CONTRAST 4/4/2023 1:01 PM     HISTORY: Fall, hit face, on Xarelto; rule out orbital/sinus fracture,  suspect nasal fracture.    TECHNIQUE: Axial CT images of the facial bones were completed with  sagittal and coronal reformations. Radiation dose for this scan was  reduced using automated exposure control, adjustment of the mA and/or  kV according to patient size, or iterative reconstruction technique.     COMPARISON: None.      Impression    IMPRESSION: Acute fractures involving the nasal bone and nasal septum.  No other facial fractures are identified. Bony orbits are intact.  Hemorrhage layering within the bilateral paranasal sinuses. Aerated  secretions/fluid within the nasal cavity and nasopharynx.  Frontal/nasal soft tissue contusion. Dental disease.    SUELLEN REYES MD         SYSTEM ID:  [id number]       For the majority of the shift this patient's behavior was Green     Cardiac Rhythm: N/A  Pt needs tele? No  Skin/wound Issues:  laceration to bridge of the nose and nasal fx.     Code Status: None listed at this time.     Pain control: Good.     Nausea control: pt had none    Abnormal labs/tests/findings requiring intervention: See imaging. Neuros Q2H x 4.     Patient tested for COVID 19 prior to admission: NO     OBS brochure/video discussed/provided to patient/family: Yes     Family present during ED course? No     Family Comments/Social Situation comments: NA    Tasks needing completion: None    Rosalind  Marta, RN

## 2023-04-04 NOTE — H&P
[facility]    History and Physical  Hospital Medicine       Date of Admission:  4/4/2023  Date of Service: 4/4/2023     Assessment & Plan   Micki Mathew is a 73 year old female with past medical history significant for atrial fibrillation, multiple tiny strokes, hypertension, and hypothyroidism who now presents on 4/4/2023 with facial trauma after trip and fall.    Head injury, initial encounter  Nasal fractures  Epistaxis secondary to nasal fractures    Patient tripped on an elevated sidewalk square and landed hitting her nose.  She had epistaxis and was found to have nasal fractures.  Epistaxis eventually resolved after nose pinching.  Last rivaroxaban was evening of the day before admission.   - Hold anticoagulation until it is clear epistaxis is resolved   - We will continue with low-dose aspirin   - Consider outpatient ENT eval for the nasal fractures    Concern for small traumatic subarachnoid hemorrhage on first head CT  Closed head injury    Repeat head CT after 6 hours showed no evidence of subarachnoid hemorrhage.  Clinically little symptoms to suggest concussion except possibly some irritability.   - Follow-up overnight for stability.  If doing well anticipate discharge home tomorrow    History of strokes  Patient reports history of small strokes that were symptomatic occurring at times when anticoagulation was held or reduced.  Patient is very adamant that she not be off her anticoagulation for very long as she believes she is at risk of recurrent stroke off the anticoagulation.      Clinically Significant Risk Factors Present on Admission        # Hypokalemia: Lowest K = 3.3 mmol/L in last 2 days, will replace as needed    # Hypercalcemia: Highest Ca = 10.2 mg/dL in last 2 days, will monitor as appropriate     # Drug Induced Coagulation Defect: home medication list includes an anticoagulant medication         # Obesity: Estimated body mass index is 39.48 kg/m  as  "calculated from the following:    Height as of this encounter: 1.626 m (5' 4\").    Weight as of this encounter: 104.3 kg (230 lb).             Diet: Regular Diet Adult    DVT Prophylaxis: Low Risk/Ambulatory with no VTE prophylaxis indicated  Jolly Catheter: Not present  Lines: None     Code Status: Full Code discussed with patient         Disposition: Anticipate discharge in 1 day(s) once remained stable. Appropriate for observation care    Braydon Cristobal MD  Hospital Medicine        Primary Care Physician   Milo Lugo [phone]    History is obtained from the patient was a Sindy Darden, family at bedside, discussion with ER provider and review of old records via the EMR.    History of Present Illness   Micki Mathew is a 73 year old female who presents with facial trauma after trip and fall.  She was in her normal state of health and there was a raised edge of a sidewalk of an inch and she tripped.  She struck her nose/face and had subsequent epistaxis.  Denies neck pain.  She hit her left knee and may have injured her right wrist though after other evaluations no injuries found.  Denies hallucinations or nausea.  Denies frequent falls.    Past Medical History    Past Medical History:   Diagnosis Date     Atrial fibrillation (H)      Diabetes (H)     MD type 2     Thyroid disease        Past Surgical History   Past Surgical History:   Procedure Laterality Date     COLONOSCOPY N/A 4/21/2017    Procedure: COMBINED COLONOSCOPY, SINGLE OR MULTIPLE BIOPSY/POLYPECTOMY BY BIOPSY;;  Surgeon: Peng Harding MD;  Location:  GI     COLONOSCOPY N/A 8/16/2018    Procedure: COMBINED COLONOSCOPY, SINGLE OR MULTIPLE BIOPSY/POLYPECTOMY BY BIOPSY;;  Surgeon: Peng Harding MD;  Location:  GI     ENT SURGERY      benign lump throat     ENT SURGERY      tonsils     GYN SURGERY      c section     ORTHOPEDIC SURGERY      left hip replacement        Prior to Admission Medications   Prior to Admission Medications "   Prescriptions Last Dose Informant Patient Reported? Taking?   ASPIRIN ADULT LOW STRENGTH PO 4/4/2023 at am Self Yes Yes   Sig: Take 81 mg by mouth daily   Ascorbic Acid (VITAMIN C PO) 4/4/2023 at am Self Yes Yes   Calcium-Magnesium-Vitamin D (CALCIUM 500 PO) 4/4/2023 at am Self Yes Yes   Cholecalciferol (VITAMIN D-3 PO) 4/4/2023 at am Self Yes Yes   FOLIC ACID PO 4/4/2023 at am Self Yes Yes   Sig: Take 1 mg by mouth daily   Omega-3 Fatty Acids (FISH OIL MAXIMUM STRENGTH PO) 4/4/2023 at am Self Yes Yes   atorvastatin (LIPITOR) 20 MG tablet Past Week Self Yes Yes   Sig: Take 20 mg by mouth daily   glucosamine-chondroitin 500-400 MG CAPS per capsule 4/4/2023 at am Self Yes Yes   Sig: Take 1 capsule by mouth daily   hydrochlorothiazide (HYDRODIURIL) 25 MG tablet 4/3/2023 at supper Self Yes Yes   Sig: Take 1 tablet by mouth daily (with dinner)   levothyroxine (SYNTHROID/LEVOTHROID) 75 MCG tablet 4/4/2023 at am Self Yes Yes   Sig: Take 75 mcg by mouth daily   magnesium 250 MG tablet 4/4/2023 at am Self Yes Yes   Sig: Take 1 tablet by mouth daily   metoprolol tartrate (LOPRESSOR) 25 MG tablet 4/4/2023 at am Self Yes Yes   Sig: Take 25 mg by mouth 2 times daily Take with 50mg total 75 mg twice daily   metoprolol tartrate (LOPRESSOR) 50 MG tablet 4/4/2023 at am Self Yes Yes   Sig: Take 50 mg by mouth 2 times daily Take with 25 mg total 75 mg twice daily   multivitamin w/minerals (THERA-VIT-M) tablet 4/4/2023 at am Self Yes Yes   Sig: Take 1 tablet by mouth daily   rivaroxaban ANTICOAGULANT (XARELTO) 20 MG TABS tablet 4/3/2023 at supper Self No Yes   Sig: Take 1 tablet (20 mg) by mouth daily (with dinner)   sertraline (ZOLOFT) 50 MG tablet 4/3/2023 at supper Self Yes Yes   Sig: Take 50 mg by mouth At Bedtime   vitamin B complex with vitamin C (STRESS TAB) tablet 4/4/2023 at am Self Yes Yes   Sig: Take 1 tablet by mouth daily   vitamin E 400 units TABS 4/4/2023 at am Self Yes Yes   Sig: Take 400 Units by mouth daily     "  Facility-Administered Medications: None            Physical Exam   BP (!) 188/79 (BP Location: Left arm)   Pulse 63   Temp 97.4  F (36.3  C) (Oral)   Resp 17   Ht 1.626 m (5' 4\")   Wt 104.3 kg (230 lb)   SpO2 96%   BMI 39.48 kg/m       Weight: 230 lbs 0 oz Body mass index is 39.48 kg/m .     Constitutional: Alert, oriented, cooperative, a bit pressured in her activities but no apparent distress, appears nontoxic  Eyes: Eyes are clear without evidence of trauma  HEENT: No evidence of cranial trauma  Lymph/Hematologic: No epitrochlear, axillary, anterior or posterior cervical, or supraclavicular lymphadenopathy is appreciated  Cardiovascular: iregular rate and rhythm, normal S1 and S2, and no murmur noted. JVP is normal. Good peripheral pulses in wrists bilaterally. No lower extremity edema  Respiratory: Clear to auscultation bilaterally  GI: Soft, non-tender, normal bowel sounds  Genitourinary: Deferred  Musculoskeletal: Normal muscle bulk and tone  Skin: Warm and dry, no rashes   Neurologic: Neck supple. Cranial nerves are grossly intact.  is symmetric.  Bicep and tricep strength strength symmetric, dorsiflexion and hip flexion within normal limits    Data   Data reviewed today:   Recent Labs   Lab 04/04/23  1230   WBC 12.5*   HGB 13.8   MCV 92      INR 1.17*      POTASSIUM 3.3*   CHLORIDE 97*   CO2 29   BUN 19.5   CR 0.97*   ANIONGAP 13   ADAM 10.2   *       Recent Results (from the past 24 hour(s))   CT Head w/o Contrast    Narrative    CT SCAN OF THE HEAD WITHOUT CONTRAST April 4, 2023 12:59 PM     HISTORY: Fall, on Xarelto.    TECHNIQUE: Axial images of the head and coronal reformations without  IV contrast material. Radiation dose for this scan was reduced using  automated exposure control, adjustment of the mA and/or kV according  to patient size, or iterative reconstruction technique.    COMPARISON: None.    FINDINGS: Subtle sulcal hyperattenuation involving the left " precentral  sulcus (series 2 image 20). Punctate area of hyperattenuation within  or along the left sylvian fissure (series 2 image 9).    Well-defined right parietal lobe infarct, age indeterminate. Smaller  right cerebellar infarct, age indeterminate. Volume loss and patchy  nonspecific white matter hypoattenuation which likely represents  chronic small vessel ischemic change. Acute nasal bone fracture. Refer  to maxillofacial CT report. Air-fluid levels within the paranasal  sinuses.      Impression    IMPRESSION:   1. Subtle sulcal hyperattenuation involving the left precentral sulcus  which could represent subtle acute subarachnoid. Recommend follow-up  head CT.  2. Punctate area of hyperattenuation within the left sylvian fissure  (series 2 image 9). Subtle subarachnoid hemorrhage not completely  excluded. Recommend attention on follow-up head CT.  3. Infarcts involving the right parietal lobe and right cerebellar  hemisphere, age indeterminate (MRI could be performed).  4. Acute nasal bone fracture. Refer to maxillofacial CT report.    SUELLEN REYES MD         SYSTEM ID:  [id number]   CT Facial Bones without Contrast    Narrative    CT FACIAL BONES WITHOUT CONTRAST 4/4/2023 1:01 PM     HISTORY: Fall, hit face, on Xarelto; rule out orbital/sinus fracture,  suspect nasal fracture.    TECHNIQUE: Axial CT images of the facial bones were completed with  sagittal and coronal reformations. Radiation dose for this scan was  reduced using automated exposure control, adjustment of the mA and/or  kV according to patient size, or iterative reconstruction technique.     COMPARISON: None.      Impression    IMPRESSION: Acute fractures involving the nasal bone and nasal septum.  No other facial fractures are identified. Bony orbits are intact.  Hemorrhage layering within the bilateral paranasal sinuses. Aerated  secretions/fluid within the nasal cavity and nasopharynx.  Frontal/nasal soft tissue contusion. Dental  disease.    SUELLEN REYES MD         SYSTEM ID:  [id number]   CT Head w/o Contrast    Narrative    EXAM: CT HEAD W/O CONTRAST  LOCATION: [facility]  DATE/TIME: 4/4/2023 7:07 PM    INDICATION: follow up subarachnoid hemorrhage  COMPARISON: 04/04/2023  TECHNIQUE: Routine CT Head without IV contrast. Multiplanar reformats. Dose reduction techniques were used.    FINDINGS:  INTRACRANIAL CONTENTS: No intracranial hemorrhage, extraaxial collection, or mass effect.  No definitive evidence of subarachnoid hemorrhage. No CT evidence of acute infarct. Age determined right parietal infarct and right cerebellar lacunar infarct. And   . Mild presumed chronic small vessel ischemic changes. Mild generalized volume loss. No hydrocephalus.     VISUALIZED ORBITS/SINUSES/MASTOIDS: Prior bilateral cataract surgery. Visualized portions of the orbits are otherwise unremarkable. No paranasal sinus mucosal disease. No middle ear or mastoid effusion.    BONES/SOFT TISSUES: Comminuted facial fractures and soft tissue swelling as before. Partial opacification of the paranasal sinuses.      Impression    IMPRESSION:  1.  No definitive evidence of subarachnoid hemorrhage.   2.  Probable mild sequela chronic small vessel ischemic disease with age-indeterminate right parietal and cerebellar infarcts as above.  3.  Partially imaged comminuted facial fractures and soft tissue swelling as described on comparison exams.       I personally reviewed no images or EKG's today.    Medical Decision Making       65 MINUTES SPENT BY ME on the date of service doing chart review, history, exam, documentation & further activities per the note.        Braydon Cristobal MD  [facility]

## 2023-04-04 NOTE — PLAN OF CARE
Skin affirmation note    Admitting nurse completed full skin assessment, Kehinde score and Kehinde interventions. This writer agrees with the initial skin assessment findings.

## 2023-04-04 NOTE — ED TRIAGE NOTES
Mechanical fall while walking out of apartment, fell onto face, breaking glasses. Pt did not lose consciousness, on Xarelto for afib. Nose clamp in place upon arrival.       Triage Assessment     Row Name 04/04/23 1139       Triage Assessment (Adult)    Airway WDL WDL       Respiratory WDL    Respiratory WDL WDL       Skin Circulation/Temperature WDL    Skin Circulation/Temperature WDL WDL       Cardiac WDL    Cardiac WDL WDL       Peripheral/Neurovascular WDL    Peripheral Neurovascular WDL WDL       Cognitive/Neuro/Behavioral WDL    Cognitive/Neuro/Behavioral WDL WDL

## 2023-04-04 NOTE — PLAN OF CARE
"[facility] ADMISSION NOTE    Patient admitted to room 2213 at approximately 1730 via wheel chair from emergency room. Patient was accompanied by transport tech.     Verbal SBAR report received from SAMANTHA Mendes prior to patient arrival.     Patient ambulated to bed independently. Patient alert and oriented X 3. The patient is not having any pain.  . Admission vital signs: Blood pressure (!) 170/86, pulse 69, temperature 97.6  F (36.4  C), temperature source Oral, resp. rate 16, height 1.626 m (5' 4\"), weight 104.3 kg (230 lb), SpO2 95 %. Patient was oriented to plan of care, call light, bed controls, tv, telephone, bathroom and visiting hours.     Risk Assessment    The following safety risks were identified during admission: fall. Yellow risk band applied: NO. Pt refused- States she will call if she is dizzy and pt does not want a bed alarm on. Discussed need for bed alarm overnight.    Skin Initial Assessment    This writer admitted this patient and completed a full skin assessment and Kehinde score in the Adult PCS flowsheet. Appropriate interventions initiated as needed.     Secondary skin check completed by Mitchell Hawkins RN.    Kehinde Risk Assessment  Sensory Perception: 4-->no impairment  Moisture: 4-->rarely moist  Activity: 3-->walks occasionally  Mobility: 3-->slightly limited  Nutrition: 4-->excellent  Friction and Shear: 3-->no apparent problem  Kehinde Score: 21  Mattress: Standard gel/foam mattress (IsoFlex, Atmos Air, etc.)  Bed Frame: Standard width and length    Education    Patient has a [place] to Observation order: Yes  Observation education completed and documented: Yes      Adrienne Doshi RN      "

## 2023-04-04 NOTE — ED PROVIDER NOTES
History     Chief Complaint   Patient presents with     Fall     HPI  Micki Mathew is a 73 year old female with past medical history of atrial fibrillation currently on Xarelto, hypertension, hypothyroidism who presents emergency department via EMS with slip and fall and landing on her face with subsequent epistaxis.  Patient denies any loss of consciousness.  Patient endorsing severe nose pain, loose lower tooth.  Patient denies confusion, dizziness, vision changes, hearing loss, garbled speech, loss of bowel or bladder function, worst headache of her life.  Patient states she was walking on the sidewalk outside and there was a gap in the sidewalk and a small up step.  Patient states she was walking so fast and missed the gap, subsequently falling face forward into the sidewalk.  Nursing staff applied a nose clamp for the nosebleed.  Patient rating pain 9 out of 10 currently.  Patient reports pain is localized primarily to the nose.    Allergies:  Allergies   Allergen Reactions     Lisinopril Cough       Problem List:    Patient Active Problem List    Diagnosis Date Noted     Subarachnoid hemorrhage (H) 04/04/2023     Priority: Medium     Epistaxis 04/04/2023     Priority: Medium     Facial trauma, initial encounter 04/04/2023     Priority: Medium     Head injury, initial encounter 04/04/2023     Priority: Medium        Past Medical History:    Past Medical History:   Diagnosis Date     Atrial fibrillation (H)      Diabetes (H)      Thyroid disease        Past Surgical History:    Past Surgical History:   Procedure Laterality Date     COLONOSCOPY N/A 4/21/2017    Procedure: COMBINED COLONOSCOPY, SINGLE OR MULTIPLE BIOPSY/POLYPECTOMY BY BIOPSY;;  Surgeon: Peng Harding MD;  Location:  GI     COLONOSCOPY N/A 8/16/2018    Procedure: COMBINED COLONOSCOPY, SINGLE OR MULTIPLE BIOPSY/POLYPECTOMY BY BIOPSY;;  Surgeon: Peng Harding MD;  Location:  GI     ENT SURGERY      benign lump throat     ENT SURGERY    "   tonsils     GYN SURGERY      c section     ORTHOPEDIC SURGERY      left hip replacement       Family History:    History reviewed. No pertinent family history.    Social History:  Marital Status:   [2]  Social History     Tobacco Use     Smoking status: Never     Smokeless tobacco: Never   Vaping Use     Vaping status: Never Used   Substance Use Topics     Alcohol use: Yes     Comment: very rare use     Drug use: No        Medications:    Ascorbic Acid (VITAMIN C PO)  ASPIRIN ADULT LOW STRENGTH PO  atorvastatin (LIPITOR) 20 MG tablet  Calcium-Magnesium-Vitamin D (CALCIUM 500 PO)  Cholecalciferol (VITAMIN D-3 PO)  FOLIC ACID PO  glucosamine-chondroitin 500-400 MG CAPS per capsule  hydrochlorothiazide (HYDRODIURIL) 25 MG tablet  levothyroxine (SYNTHROID/LEVOTHROID) 75 MCG tablet  magnesium 250 MG tablet  metoprolol tartrate (LOPRESSOR) 25 MG tablet  metoprolol tartrate (LOPRESSOR) 50 MG tablet  multivitamin w/minerals (THERA-VIT-M) tablet  Omega-3 Fatty Acids (FISH OIL MAXIMUM STRENGTH PO)  rivaroxaban ANTICOAGULANT (XARELTO) 20 MG TABS tablet  sertraline (ZOLOFT) 50 MG tablet  vitamin B complex with vitamin C (STRESS TAB) tablet  vitamin E 400 units TABS      Review of Systems  As mentioned above in the history present illness. All other systems were reviewed and are negative.    Physical Exam   BP: (!) 159/93  Pulse: 65  Temp: 97.3  F (36.3  C)  Resp: 16  Height: 162.6 cm (5' 4\")  Weight: 104.3 kg (230 lb)  SpO2: 98 %      Physical Exam  Vitals and nursing note reviewed.   Constitutional:       General: She is in acute distress.      Appearance: She is well-developed. She is obese. She is not ill-appearing or toxic-appearing.   HENT:      Head: Normocephalic. Raccoon eyes (inner periorbital region bilaterally), abrasion (nasal bridge) and contusion (over nasal bridge and extending down nose with swelling) present. No Cuevas's sign.      Jaw: There is normal jaw occlusion. No trismus, tenderness, swelling " or pain on movement.      Right Ear: Hearing, tympanic membrane, ear canal and external ear normal. No drainage. There is no impacted cerumen.      Left Ear: Hearing, tympanic membrane, ear canal and external ear normal. No drainage. There is no impacted cerumen.      Nose: Nose normal. No rhinorrhea.      Mouth/Throat:      Lips: Pink.      Mouth: Mucous membranes are moist. No injury or lacerations.      Dentition: Dental caries present.      Pharynx: Oropharynx is clear. Uvula midline. No oropharyngeal exudate, posterior oropharyngeal erythema or uvula swelling.      Tonsils: No tonsillar exudate. 0 on the right. 0 on the left.     Eyes:      Extraocular Movements:      Right eye: Normal extraocular motion and no nystagmus.      Left eye: Normal extraocular motion and no nystagmus.      Conjunctiva/sclera: Conjunctivae normal.      Right eye: Right conjunctiva is not injected. No exudate or hemorrhage.     Left eye: Left conjunctiva is not injected. No exudate or hemorrhage.     Pupils: Pupils are equal, round, and reactive to light.   Neck:      Trachea: No tracheal deviation.   Cardiovascular:      Rate and Rhythm: Normal rate and regular rhythm.      Heart sounds: Normal heart sounds. No murmur heard.     No friction rub. No gallop.   Pulmonary:      Effort: Pulmonary effort is normal. No respiratory distress.      Breath sounds: Normal breath sounds. No stridor. No wheezing or rales.   Abdominal:      General: Bowel sounds are normal. There is no distension.      Palpations: Abdomen is soft. There is no mass.      Tenderness: There is no abdominal tenderness. There is no guarding.   Musculoskeletal:         General: Normal range of motion.      Cervical back: Normal range of motion and neck supple.   Lymphadenopathy:      Cervical: No cervical adenopathy.   Skin:     General: Skin is warm.      Capillary Refill: Capillary refill takes less than 2 seconds.      Coloration: Skin is not pale.      Findings: No  erythema or rash.   Neurological:      Mental Status: She is alert and oriented to person, place, and time.      GCS: GCS eye subscore is 4. GCS verbal subscore is 5. GCS motor subscore is 6.      Cranial Nerves: No cranial nerve deficit or facial asymmetry.      Sensory: No sensory deficit.      Motor: No abnormal muscle tone.      Coordination: Coordination normal.      Deep Tendon Reflexes: Reflexes are normal and symmetric. Reflexes normal.   Psychiatric:         Mood and Affect: Mood normal.         Behavior: Behavior normal. Behavior is cooperative.         ED Course              ED Course as of 04/04/23 1658   Tue Apr 04, 2023   1430 Consultation with neurosurgery, Dr Manuel Camp, review of CT with recommendation, no intervention, follow up CT in 6 hours.  Recommend hold xarelto for one week.   1530 Consultation with hospitalist Lalito Vasquezmiyue, who agrees to observation for patient due to subarachnoid hemorrhage.  Orders placed.     Procedures      Results for orders placed or performed during the hospital encounter of 04/04/23 (from the past 24 hour(s))   CBC with platelets differential    Narrative    The following orders were created for panel order CBC with platelets differential.  Procedure                               Abnormality         Status                     ---------                               -----------         ------                     CBC with platelets and d...[[id number]]  Abnormal            Final result                 Please view results for these tests on the individual orders.   INR   Result Value Ref Range    INR 1.17 (H) 0.85 - 1.15   Basic metabolic panel   Result Value Ref Range    Sodium 139 136 - 145 mmol/L    Potassium 3.3 (L) 3.4 - 5.3 mmol/L    Chloride 97 (L) 98 - 107 mmol/L    Carbon Dioxide (CO2) 29 22 - 29 mmol/L    Anion Gap 13 7 - 15 mmol/L    Urea Nitrogen 19.5 8.0 - 23.0 mg/dL    Creatinine 0.97 (H) 0.51 - 0.95 mg/dL    Calcium 10.2 8.8 - 10.2 mg/dL    Glucose  113 (H) 70 - 99 mg/dL    GFR Estimate 61 >60 mL/min/1.73m2   CBC with platelets and differential   Result Value Ref Range    WBC Count 12.5 (H) 4.0 - 11.0 10e3/uL    RBC Count 4.51 3.80 - 5.20 10e6/uL    Hemoglobin 13.8 11.7 - 15.7 g/dL    Hematocrit 41.6 35.0 - 47.0 %    MCV 92 78 - 100 fL    MCH 30.6 26.5 - 33.0 pg    MCHC 33.2 31.5 - 36.5 g/dL    RDW 13.0 10.0 - 15.0 %    Platelet Count 240 150 - 450 10e3/uL    % Neutrophils 80 %    % Lymphocytes 10 %    % Monocytes 8 %    % Eosinophils 1 %    % Basophils 1 %    % Immature Granulocytes 0 %    NRBCs per 100 WBC 0 <1 /100    Absolute Neutrophils 10.1 (H) 1.6 - 8.3 10e3/uL    Absolute Lymphocytes 1.3 0.8 - 5.3 10e3/uL    Absolute Monocytes 1.0 0.0 - 1.3 10e3/uL    Absolute Eosinophils 0.1 0.0 - 0.7 10e3/uL    Absolute Basophils 0.1 0.0 - 0.2 10e3/uL    Absolute Immature Granulocytes 0.1 <=0.4 10e3/uL    Absolute NRBCs 0.0 10e3/uL   Extra Tube    Narrative    The following orders were created for panel order Extra Tube.  Procedure                               Abnormality         Status                     ---------                               -----------         ------                     Extra Red Top Tube[[id number]]                               Final result                 Please view results for these tests on the individual orders.   Extra Red Top Tube   Result Value Ref Range    Hold Specimen [facility]    CT Head w/o Contrast    Narrative    CT SCAN OF THE HEAD WITHOUT CONTRAST April 4, 2023 12:59 PM     HISTORY: Fall, on Xarelto.    TECHNIQUE: Axial images of the head and coronal reformations without  IV contrast material. Radiation dose for this scan was reduced using  automated exposure control, adjustment of the mA and/or kV according  to patient size, or iterative reconstruction technique.    COMPARISON: None.    FINDINGS: Subtle sulcal hyperattenuation involving the left precentral  sulcus (series 2 image 20). Punctate area of hyperattenuation within  or  along the left sylvian fissure (series 2 image 9).    Well-defined right parietal lobe infarct, age indeterminate. Smaller  right cerebellar infarct, age indeterminate. Volume loss and patchy  nonspecific white matter hypoattenuation which likely represents  chronic small vessel ischemic change. Acute nasal bone fracture. Refer  to maxillofacial CT report. Air-fluid levels within the paranasal  sinuses.      Impression    IMPRESSION:   1. Subtle sulcal hyperattenuation involving the left precentral sulcus  which could represent subtle acute subarachnoid. Recommend follow-up  head CT.  2. Punctate area of hyperattenuation within the left sylvian fissure  (series 2 image 9). Subtle subarachnoid hemorrhage not completely  excluded. Recommend attention on follow-up head CT.  3. Infarcts involving the right parietal lobe and right cerebellar  hemisphere, age indeterminate (MRI could be performed).  4. Acute nasal bone fracture. Refer to maxillofacial CT report.    SUELLEN REYES MD         SYSTEM ID:  [id number]   CT Facial Bones without Contrast    Narrative    CT FACIAL BONES WITHOUT CONTRAST 4/4/2023 1:01 PM     HISTORY: Fall, hit face, on Xarelto; rule out orbital/sinus fracture,  suspect nasal fracture.    TECHNIQUE: Axial CT images of the facial bones were completed with  sagittal and coronal reformations. Radiation dose for this scan was  reduced using automated exposure control, adjustment of the mA and/or  kV according to patient size, or iterative reconstruction technique.     COMPARISON: None.      Impression    IMPRESSION: Acute fractures involving the nasal bone and nasal septum.  No other facial fractures are identified. Bony orbits are intact.  Hemorrhage layering within the bilateral paranasal sinuses. Aerated  secretions/fluid within the nasal cavity and nasopharynx.  Frontal/nasal soft tissue contusion. Dental disease.    SUELLEN REYES MD         SYSTEM ID:  [id number]       Medications   famotidine  (PEPCID) tablet 20 mg (has no administration in time range)   acetaminophen (TYLENOL) Suppository 650 mg (has no administration in time range)   prochlorperazine (COMPAZINE) injection 5 mg (has no administration in time range)     Or   prochlorperazine (COMPAZINE) tablet 5 mg (has no administration in time range)     Or   prochlorperazine (COMPAZINE) suppository 12.5 mg (has no administration in time range)   oxyCODONE (ROXICODONE) tablet 5 mg (has no administration in time range)   acetaminophen (TYLENOL) tablet 1,000 mg (1,000 mg Oral $Given 4/4/23 1232)   oxyCODONE (ROXICODONE) tablet 5 mg (5 mg Oral $Given 4/4/23 1232)       Assessments & Plan (with Medical Decision Making)     I have reviewed the nursing notes.    I have reviewed the findings, diagnosis, plan and need for follow up with the patient.  Micki Mathew is a 73 year old female with past medical history of atrial fibrillation currently on Xarelto, hypertension, hypothyroidism who presents emergency department via EMS with slip and fall and landing on her face with subsequent epistaxis.  Patient denies any loss of consciousness.  Patient endorsing severe nose pain, loose lower tooth.  Patient denies confusion, dizziness, vision changes, hearing loss, garbled speech, loss of bowel or bladder function, worst headache of her life.  Patient states she was walking on the sidewalk outside and there was a gap in the sidewalk and a small up step.  Patient states she was walking so fast and missed the gap, subsequently falling face forward into the sidewalk.  On exam neuro exam is normal, left goal coma score is 15, patient has moderate abrasion over the nasal bridge, swelling, bruising in the inner canthus of the bilateral periorbital region and nose clamp on nose with blood clot in each nares.  CT scanning of the face reveals nasal fracture including septum with no periorbital or orbital fracture.  No sinus fracture noted on scan either.  CT of the head  reveals a punctation noted concerning for a small subarachnoid hemorrhage.  Consultation completed with neurosurgery recommend observation and repeat CT scan in 6 hours.  Patient lives alone and no one to monitor her.  Will request observation admission and discussed this with Dr. Cristobal who agrees to observation.  Orders placed.  CT follow-up ordered.          New Prescriptions    No medications on file       Final diagnoses:   Facial trauma, initial encounter   Epistaxis   Subarachnoid hemorrhage (H)   Head injury, initial encounter       4/4/2023   [facility] EMERGENCY DEPT     Oxana Kat, APRN CNP  04/04/23 [id number]

## 2023-04-04 NOTE — MEDICATION SCRIBE - ADMISSION MEDICATION HISTORY
Medication Scribe Admission Medication History    Admission medication history is complete. The information provided in this note is only as accurate as the sources available at the time of the update.    Medication reconciliation/reorder completed by provider prior to medication history? No    Information Source(s): Patient via phone Cell [phone]    Pertinent Information: Patient reports no knowledge of the strength of OTC supplement doses.     Changes made to PTA medication list:    Added: Sertraline from outside med source; confirmed recent fill with fill history    Added: Metoprolol 25 mg; confirmed recent fill with fill history/dispense report     Deleted: Citalopram: no history of fills, patient confirms she is only on one Antidepressant     Changed: Levothyroxine PO to Levothyroxine 75 mcg daily; confirmed with outside source and recent fill/ dispense report     Medication Affordability:  Not including over the counter (OTC) medications, was there a time in the past 12 months when you did not take your medications as prescribed because of cost?: No    Allergies reviewed with patient and updates made in EHR: yes    Medication History Completed By: VALERIE WATTERS 4/4/2023 4:46 PM    PTA Med List   Medication Sig Note Last Dose     Ascorbic Acid (VITAMIN C PO)   4/4/2023 at am     ASPIRIN ADULT LOW STRENGTH PO Take 81 mg by mouth daily  4/4/2023 at am     atorvastatin (LIPITOR) 20 MG tablet Take 20 mg by mouth daily 4/4/2023: Unsure if this was taken last night or this morning Past Week     Calcium-Magnesium-Vitamin D (CALCIUM 500 PO)   4/4/2023 at am     Cholecalciferol (VITAMIN D-3 PO)   4/4/2023 at am     FOLIC ACID PO Take 1 mg by mouth daily  4/4/2023 at am     glucosamine-chondroitin 500-400 MG CAPS per capsule Take 1 capsule by mouth daily  4/4/2023 at am     hydrochlorothiazide (HYDRODIURIL) 25 MG tablet Take 1 tablet by mouth daily (with dinner)  4/3/2023 at supper     levothyroxine  (SYNTHROID/LEVOTHROID) 75 MCG tablet Take 75 mcg by mouth daily  4/4/2023 at am     magnesium 250 MG tablet Take 1 tablet by mouth daily  4/4/2023 at am     metoprolol tartrate (LOPRESSOR) 25 MG tablet Take 25 mg by mouth 2 times daily Take with 50mg total 75 mg twice daily  4/4/2023 at am     metoprolol tartrate (LOPRESSOR) 50 MG tablet Take 50 mg by mouth 2 times daily Take with 25 mg total 75 mg twice daily  4/4/2023 at am     multivitamin w/minerals (THERA-VIT-M) tablet Take 1 tablet by mouth daily  4/4/2023 at am     Omega-3 Fatty Acids (FISH OIL MAXIMUM STRENGTH PO)   4/4/2023 at am     rivaroxaban ANTICOAGULANT (XARELTO) 20 MG TABS tablet Take 1 tablet (20 mg) by mouth daily (with dinner)  4/3/2023 at supper     sertraline (ZOLOFT) 50 MG tablet Take 50 mg by mouth At Bedtime  4/3/2023 at supper     vitamin B complex with vitamin C (STRESS TAB) tablet Take 1 tablet by mouth daily  4/4/2023 at am     vitamin E 400 units TABS Take 400 Units by mouth daily  4/4/2023 at am

## 2023-04-05 VITALS
HEART RATE: 57 BPM | DIASTOLIC BLOOD PRESSURE: 81 MMHG | TEMPERATURE: 97.8 F | SYSTOLIC BLOOD PRESSURE: 165 MMHG | RESPIRATION RATE: 18 BRPM | BODY MASS INDEX: 39.27 KG/M2 | OXYGEN SATURATION: 96 % | WEIGHT: 230 LBS | HEIGHT: 64 IN

## 2023-04-05 PROBLEM — Z86.59 HISTORY OF DEPRESSION: Status: ACTIVE | Noted: 2022-06-22

## 2023-04-05 PROBLEM — S02.2XXD CLOSED FRACTURE OF NASAL BONE WITH ROUTINE HEALING: Status: ACTIVE | Noted: 2023-04-05

## 2023-04-05 PROCEDURE — G0378 HOSPITAL OBSERVATION PER HR: HCPCS

## 2023-04-05 PROCEDURE — 99238 HOSP IP/OBS DSCHRG MGMT 30/<: CPT | Performed by: INTERNAL MEDICINE

## 2023-04-05 PROCEDURE — 250N000013 HC RX MED GY IP 250 OP 250 PS 637: Performed by: NURSE PRACTITIONER

## 2023-04-05 PROCEDURE — 250N000013 HC RX MED GY IP 250 OP 250 PS 637: Performed by: INTERNAL MEDICINE

## 2023-04-05 RX ORDER — NALOXONE HYDROCHLORIDE 0.4 MG/ML
0.4 INJECTION, SOLUTION INTRAMUSCULAR; INTRAVENOUS; SUBCUTANEOUS
Status: DISCONTINUED | OUTPATIENT
Start: 2023-04-05 | End: 2023-04-05 | Stop reason: HOSPADM

## 2023-04-05 RX ORDER — NALOXONE HYDROCHLORIDE 0.4 MG/ML
0.2 INJECTION, SOLUTION INTRAMUSCULAR; INTRAVENOUS; SUBCUTANEOUS
Status: DISCONTINUED | OUTPATIENT
Start: 2023-04-05 | End: 2023-04-05 | Stop reason: HOSPADM

## 2023-04-05 RX ORDER — OXYCODONE HYDROCHLORIDE 5 MG/1
5 TABLET ORAL EVERY 4 HOURS PRN
Qty: 12 TABLET | Refills: 0 | Status: SHIPPED | OUTPATIENT
Start: 2023-04-05

## 2023-04-05 RX ADMIN — OXYCODONE 5 MG: 5 TABLET ORAL at 02:24

## 2023-04-05 RX ADMIN — ASPIRIN 81 MG: 81 TABLET, COATED ORAL at 08:03

## 2023-04-05 RX ADMIN — OXYCODONE 5 MG: 5 TABLET ORAL at 09:29

## 2023-04-05 RX ADMIN — METOPROLOL TARTRATE 75 MG: 25 TABLET, FILM COATED ORAL at 08:05

## 2023-04-05 RX ADMIN — FAMOTIDINE 20 MG: 20 TABLET, FILM COATED ORAL at 08:03

## 2023-04-05 RX ADMIN — ATORVASTATIN CALCIUM 20 MG: 20 TABLET, FILM COATED ORAL at 08:07

## 2023-04-05 RX ADMIN — OXYCODONE 5 MG: 5 TABLET ORAL at 13:36

## 2023-04-05 RX ADMIN — LEVOTHYROXINE SODIUM 75 MCG: 75 TABLET ORAL at 08:04

## 2023-04-05 ASSESSMENT — ACTIVITIES OF DAILY LIVING (ADL)
ADLS_ACUITY_SCORE: 33
ADLS_ACUITY_SCORE: 33
ADLS_ACUITY_SCORE: 31

## 2023-04-05 NOTE — PROGRESS NOTES
A&O x 4. Ambulates independently in room due to refusal of gait belt and request to be within arms reach of staff. Patient states she felt dizzy this morning and spit up bright red blood upon awaking. MD notified via charge during morning huddle. HA rated 3/10 but denied pain medicine initially, but requested in late morning.      This afternoon, pt complains of HA rated 10/10., second dose oxycodone administered. MD completed rounds.  Patient denies dizziness and continues so ambulate in room against request of RN to remain in bed or allow staff to assist.

## 2023-04-05 NOTE — DISCHARGE SUMMARY
[facility]  Hospitalist Discharge Summary      Date of Admission:  4/4/2023  Date of Discharge:  4/5/2023  Discharging Provider: Jenny Chavez MD  Discharge Service: Hospitalist Service    Discharge Diagnoses   Principal Problem:    Head injury, initial encounter  Active Problems:    Epistaxis    Facial trauma, initial encounter    Closed fracture of nasal bone with routine healing    Essential hypertension    History of depression    Hypothyroidism      Follow-ups Needed After Discharge   Follow-up Appointments     Follow-up and recommended labs and tests       Follow up with primary care provider, MARIELA VALDEZ, within 2-3days   patient to arrange, to evaluate medication change and for hospital follow-   up. The following labs/tests are recommended: CBC, BMP.             Unresulted Labs Ordered in the Past 30 Days of this Admission     No orders found for last 31 day(s).          Discharge Disposition   Discharged to home  Condition at discharge: Stable      Hospital Course   Micki Mathew is a 73 year old female with past medical history significant for atrial fibrillation, multiple tiny strokes, hypertension, and hypothyroidism who now presents on 4/4/2023 with facial trauma after trip and fall.     Head injury, initial encounter  Nasal fractures  Epistaxis secondary to nasal fractures  - Acute fractures involving the nasal bone and nasal septum. No other facial fractures are identified. Bony orbits are intact. Hemorrhage layering within the bilateral paranasal sinuses. Aerated secretions/fluid within the nasal cavity and nasopharynx. Frontal/nasal soft tissue contusion.    - Last rivaroxaban was evening of the day before admission.   - We will continue with low-dose aspirin  - ENT referral   - holding rivaroxaban and per patient previously was reduced in half for 7 days prior to surgery and she had a stroke during that time. She agrees to hold for maximum of 4 days and  acknowledges increased risk of bleeding if resumption but she fears recurrent stroke.   - no SAH on repeat imaging      Concern for small traumatic subarachnoid hemorrhage on first head CT  Closed head injury    Repeat head CT after 6 hours showed no evidence of subarachnoid hemorrhage.  Clinically little symptoms to suggest concussion except possibly some irritability.  - ambulating well on own      History of strokes  Patient reports history of small strokes that were symptomatic occurring at times when anticoagulation was held or reduced.  Patient is very adamant that she not be off her anticoagulation for very long as she believes she is at risk of recurrent stroke off the anticoagulation.       Consultations This Hospital Stay   SURGERY GENERAL IP CONSULT    Code Status   Full Code    Time Spent on this Encounter          Jenny Chavez MD  [facility] MEDICAL SURGICAL  [address] [facility] [address]  Phone: [phone]  Fax: [phone]  ______________________________________________________________________    Physical Exam   Vital Signs: Temp: 97.8  F (36.6  C) Temp src: Oral BP: (!) 165/81 Pulse: 57   Resp: 18 SpO2: 96 % O2 Device: None (Room air)    Weight: 230 lbs 0 oz  Physical Exam  HENT:      Head:      Comments: raccoon eyes and bilateral periorbital edema no visual field deficits, TTP  Eyes:      Extraocular Movements: Extraocular movements intact.      Pupils: Pupils are equal, round, and reactive to light.   Cardiovascular:      Rate and Rhythm: Normal rate.      Pulses: Normal pulses.   Pulmonary:      Effort: Pulmonary effort is normal.      Breath sounds: Normal breath sounds.   Abdominal:      General: Bowel sounds are normal.      Palpations: Abdomen is soft.   Musculoskeletal:         General: Normal range of motion.   Skin:     General: Skin is warm and dry.   Neurological:      Mental Status: She is oriented to person, place, and time. Mental status is at  baseline.              Primary Care Physician   MARIELA VALDEZ    Discharge Orders      Adult ENT  Referral      Reason for your hospital stay    Principal Problem:    Head injury, initial encounter  Active Problems:    Subarachnoid hemorrhage (H)    Epistaxis    Facial trauma, initial encounter     Follow-up and recommended labs and tests     Follow up with primary care provider, MARIELA VALDEZ, within 2-3days patient to arrange, to evaluate medication change and for hospital follow- up. The following labs/tests are recommended: CBC, BMP.     Activity    Your activity upon discharge: activity as tolerated     Discharge Instructions    It was recommended to hold your Xarelto for 7 days but since of your history of recurrent CVA after decreased Xarelto dose for 7 days it was agreed on to hold at least until Friday which would 4 days. You acknowledge to risk of increased facial bleeding/hematoma size or other bleeding complication. Continue Ice and rest.   If increased facial swelling or changes please seek medical attention     Diet    Follow this diet upon discharge: Orders Placed This Encounter      Regular Diet Adult       Significant Results and Procedures   Most Recent 3 CBC's:Recent Labs   Lab Test 04/04/23  1230   WBC 12.5*   HGB 13.8   MCV 92        Most Recent 3 BMP's:Recent Labs   Lab Test 04/04/23  1230      POTASSIUM 3.3*   CHLORIDE 97*   CO2 29   BUN 19.5   CR 0.97*   ANIONGAP 13   ADAM 10.2   *     Most Recent 2 LFT's:No lab results found.  Most Recent 3 INR's:Recent Labs   Lab Test 04/04/23  1230   INR 1.17*   ,   Results for orders placed or performed during the hospital encounter of 04/04/23   CT Head w/o Contrast    Narrative    CT SCAN OF THE HEAD WITHOUT CONTRAST April 4, 2023 12:59 PM     HISTORY: Fall, on Xarelto.    TECHNIQUE: Axial images of the head and coronal reformations without  IV contrast material. Radiation dose for this scan was reduced using  automated  exposure control, adjustment of the mA and/or kV according  to patient size, or iterative reconstruction technique.    COMPARISON: None.    FINDINGS: Subtle sulcal hyperattenuation involving the left precentral  sulcus (series 2 image 20). Punctate area of hyperattenuation within  or along the left sylvian fissure (series 2 image 9).    Well-defined right parietal lobe infarct, age indeterminate. Smaller  right cerebellar infarct, age indeterminate. Volume loss and patchy  nonspecific white matter hypoattenuation which likely represents  chronic small vessel ischemic change. Acute nasal bone fracture. Refer  to maxillofacial CT report. Air-fluid levels within the paranasal  sinuses.      Impression    IMPRESSION:   1. Subtle sulcal hyperattenuation involving the left precentral sulcus  which could represent subtle acute subarachnoid. Recommend follow-up  head CT.  2. Punctate area of hyperattenuation within the left sylvian fissure  (series 2 image 9). Subtle subarachnoid hemorrhage not completely  excluded. Recommend attention on follow-up head CT.  3. Infarcts involving the right parietal lobe and right cerebellar  hemisphere, age indeterminate (MRI could be performed).  4. Acute nasal bone fracture. Refer to maxillofacial CT report.    SUELLEN REYES MD         SYSTEM ID:  [id number]   CT Facial Bones without Contrast    Narrative    CT FACIAL BONES WITHOUT CONTRAST 4/4/2023 1:01 PM     HISTORY: Fall, hit face, on Xarelto; rule out orbital/sinus fracture,  suspect nasal fracture.    TECHNIQUE: Axial CT images of the facial bones were completed with  sagittal and coronal reformations. Radiation dose for this scan was  reduced using automated exposure control, adjustment of the mA and/or  kV according to patient size, or iterative reconstruction technique.     COMPARISON: None.      Impression    IMPRESSION: Acute fractures involving the nasal bone and nasal septum.  No other facial fractures are identified. Bony  orbits are intact.  Hemorrhage layering within the bilateral paranasal sinuses. Aerated  secretions/fluid within the nasal cavity and nasopharynx.  Frontal/nasal soft tissue contusion. Dental disease.    SUELLEN REYES MD         SYSTEM ID:  [id number]   CT Head w/o Contrast    Narrative    EXAM: CT HEAD W/O CONTRAST  LOCATION: [facility]  DATE/TIME: 4/4/2023 7:07 PM    INDICATION: follow up subarachnoid hemorrhage  COMPARISON: 04/04/2023  TECHNIQUE: Routine CT Head without IV contrast. Multiplanar reformats. Dose reduction techniques were used.    FINDINGS:  INTRACRANIAL CONTENTS: No intracranial hemorrhage, extraaxial collection, or mass effect.  No definitive evidence of subarachnoid hemorrhage. No CT evidence of acute infarct. Age determined right parietal infarct and right cerebellar lacunar infarct. And   . Mild presumed chronic small vessel ischemic changes. Mild generalized volume loss. No hydrocephalus.     VISUALIZED ORBITS/SINUSES/MASTOIDS: Prior bilateral cataract surgery. Visualized portions of the orbits are otherwise unremarkable. No paranasal sinus mucosal disease. No middle ear or mastoid effusion.    BONES/SOFT TISSUES: Comminuted facial fractures and soft tissue swelling as before. Partial opacification of the paranasal sinuses.      Impression    IMPRESSION:  1.  No definitive evidence of subarachnoid hemorrhage.   2.  Probable mild sequela chronic small vessel ischemic disease with age-indeterminate right parietal and cerebellar infarcts as above.  3.  Partially imaged comminuted facial fractures and soft tissue swelling as described on comparison exams.       Discharge Medications   Current Discharge Medication List      START taking these medications    Details   oxyCODONE (ROXICODONE) 5 MG tablet Take 1 tablet (5 mg) by mouth every 4 hours as needed for severe pain  Qty: 12 tablet, Refills: 0    Associated Diagnoses: Facial trauma, initial encounter          CONTINUE these medications which have CHANGED    Details   rivaroxaban ANTICOAGULANT (XARELTO ANTICOAGULANT) 20 MG TABS tablet Take 1 tablet (20 mg) by mouth daily (with dinner)    Comments: No new script patient to resume Friday  Associated Diagnoses: Paroxysmal atrial fibrillation (H)         CONTINUE these medications which have NOT CHANGED    Details   Ascorbic Acid (VITAMIN C PO)       ASPIRIN ADULT LOW STRENGTH PO Take 81 mg by mouth daily      atorvastatin (LIPITOR) 20 MG tablet Take 20 mg by mouth daily      Calcium-Magnesium-Vitamin D (CALCIUM 500 PO)       Cholecalciferol (VITAMIN D-3 PO)       FOLIC ACID PO Take 1 mg by mouth daily      glucosamine-chondroitin 500-400 MG CAPS per capsule Take 1 capsule by mouth daily      hydrochlorothiazide (HYDRODIURIL) 25 MG tablet Take 1 tablet by mouth daily (with dinner)      levothyroxine (SYNTHROID/LEVOTHROID) 75 MCG tablet Take 75 mcg by mouth daily      magnesium 250 MG tablet Take 1 tablet by mouth daily      !! metoprolol tartrate (LOPRESSOR) 25 MG tablet Take 25 mg by mouth 2 times daily Take with 50mg total 75 mg twice daily      !! metoprolol tartrate (LOPRESSOR) 50 MG tablet Take 50 mg by mouth 2 times daily Take with 25 mg total 75 mg twice daily      multivitamin w/minerals (THERA-VIT-M) tablet Take 1 tablet by mouth daily      Omega-3 Fatty Acids (FISH OIL MAXIMUM STRENGTH PO)       sertraline (ZOLOFT) 50 MG tablet Take 50 mg by mouth At Bedtime      vitamin B complex with vitamin C (STRESS TAB) tablet Take 1 tablet by mouth daily      vitamin E 400 units TABS Take 400 Units by mouth daily       !! - Potential duplicate medications found. Please discuss with provider.        Allergies   Allergies   Allergen Reactions     Lisinopril Cough

## 2023-04-05 NOTE — PROGRESS NOTES
AOx4, up independently to bathroom void. Patient was educated that she is considered a fall risk since she had a fall that brought her to the hospital and also that she is on xarelto. Patient stated that she refuses to call for, or have assistance while OOB, or going to the bathroom, also refused insertion of an IV in ED. She stated she will sign any waiver necessary, but that she knows that she isn't absolutely required to. PRN oxycodone 5mg, tylenol x 1  for c/o facial pain and HA, continues to have small amount of drainage/dripping from bilateral nostrils, drainage clear/bloody. Patient applying ice consistently, she states this does help with the pain. VSS, IV SL, rested comfortably throughout shift,

## 2023-04-05 NOTE — PROGRESS NOTES
[facility] [facility] DISCHARGE NOTE    Patient discharged to home at 2:21 PM via wheel chair. Accompanied by other:Friend and staff. Discharge instructions reviewed with patient, opportunity offered to ask questions. Prescriptions filled and sent with patient upon discharge. All belongings sent with patient.    Kellie Ott RN

## 2023-04-07 ENCOUNTER — HOSPITAL ENCOUNTER (OUTPATIENT)
Facility: CLINIC | Age: 73
Discharge: HOME | End: 2023-04-08
Attending: EMERGENCY MEDICINE | Admitting: OTOLARYNGOLOGY
Payer: COMMERCIAL

## 2023-04-07 ENCOUNTER — HOSPITAL ENCOUNTER (EMERGENCY)
Facility: CLINIC | Age: 73
Discharge: HOME | End: 2023-04-07
Attending: EMERGENCY MEDICINE | Admitting: EMERGENCY MEDICINE
Payer: COMMERCIAL

## 2023-04-07 ENCOUNTER — APPOINTMENT (OUTPATIENT)
Dept: CT IMAGING | Facility: CLINIC | Age: 73
End: 2023-04-07
Attending: EMERGENCY MEDICINE
Payer: COMMERCIAL

## 2023-04-07 ENCOUNTER — PREP FOR PROCEDURE (OUTPATIENT)
Dept: SLEEP MEDICINE | Facility: CLINIC | Age: 73
End: 2023-04-07
Payer: COMMERCIAL

## 2023-04-07 VITALS
DIASTOLIC BLOOD PRESSURE: 70 MMHG | OXYGEN SATURATION: 98 % | SYSTOLIC BLOOD PRESSURE: 129 MMHG | HEIGHT: 64 IN | HEART RATE: 62 BPM | BODY MASS INDEX: 39.27 KG/M2 | WEIGHT: 230 LBS | TEMPERATURE: 97.7 F

## 2023-04-07 DIAGNOSIS — Z98.890 POST-OPERATIVE NAUSEA AND VOMITING: Primary | ICD-10-CM

## 2023-04-07 DIAGNOSIS — R93.89 ABNORMAL CT SCAN: ICD-10-CM

## 2023-04-07 DIAGNOSIS — S02.2XXA CLOSED FRACTURE OF NASAL BONE, INITIAL ENCOUNTER: ICD-10-CM

## 2023-04-07 DIAGNOSIS — S00.33XA HEMATOMA OF NASAL SEPTUM, INITIAL ENCOUNTER: ICD-10-CM

## 2023-04-07 DIAGNOSIS — R11.2 POST-OPERATIVE NAUSEA AND VOMITING: Primary | ICD-10-CM

## 2023-04-07 DIAGNOSIS — S00.33XA HEMATOMA OF NASAL SEPTUM, INITIAL ENCOUNTER: Primary | ICD-10-CM

## 2023-04-07 PROCEDURE — 30901 CONTROL OF NOSEBLEED: CPT | Performed by: EMERGENCY MEDICINE

## 2023-04-07 PROCEDURE — 258N000003 HC RX IP 258 OP 636: Performed by: EMERGENCY MEDICINE

## 2023-04-07 PROCEDURE — 99285 EMERGENCY DEPT VISIT HI MDM: CPT | Performed by: EMERGENCY MEDICINE

## 2023-04-07 PROCEDURE — 96360 HYDRATION IV INFUSION INIT: CPT | Performed by: EMERGENCY MEDICINE

## 2023-04-07 PROCEDURE — 250N000011 HC RX IP 250 OP 636: Performed by: EMERGENCY MEDICINE

## 2023-04-07 PROCEDURE — 99285 EMERGENCY DEPT VISIT HI MDM: CPT | Mod: 25 | Performed by: EMERGENCY MEDICINE

## 2023-04-07 PROCEDURE — 250N000013 HC RX MED GY IP 250 OP 250 PS 637: Performed by: EMERGENCY MEDICINE

## 2023-04-07 PROCEDURE — 70486 CT MAXILLOFACIAL W/O DYE: CPT

## 2023-04-07 PROCEDURE — 250N000009 HC RX 250: Performed by: EMERGENCY MEDICINE

## 2023-04-07 PROCEDURE — 99284 EMERGENCY DEPT VISIT MOD MDM: CPT | Mod: 25 | Performed by: EMERGENCY MEDICINE

## 2023-04-07 RX ORDER — LEVOTHYROXINE SODIUM 75 UG/1
75 TABLET ORAL
Status: DISCONTINUED | OUTPATIENT
Start: 2023-04-08 | End: 2023-04-08 | Stop reason: HOSPADM

## 2023-04-07 RX ORDER — METOPROLOL TARTRATE 50 MG
50 TABLET ORAL 2 TIMES DAILY
Status: DISCONTINUED | OUTPATIENT
Start: 2023-04-08 | End: 2023-04-08 | Stop reason: HOSPADM

## 2023-04-07 RX ORDER — HYDROCHLOROTHIAZIDE 25 MG/1
25 TABLET ORAL
Status: DISCONTINUED | OUTPATIENT
Start: 2023-04-07 | End: 2023-04-08 | Stop reason: HOSPADM

## 2023-04-07 RX ORDER — METOPROLOL TARTRATE 25 MG/1
25 TABLET, FILM COATED ORAL 2 TIMES DAILY
Status: DISCONTINUED | OUTPATIENT
Start: 2023-04-08 | End: 2023-04-08 | Stop reason: HOSPADM

## 2023-04-07 RX ORDER — OXYMETAZOLINE HYDROCHLORIDE 0.05 G/100ML
2 SPRAY NASAL 3 TIMES DAILY
Qty: 1.8 ML | Refills: 0 | Status: SHIPPED | OUTPATIENT
Start: 2023-04-07 | End: 2023-04-10

## 2023-04-07 RX ORDER — OXYCODONE HYDROCHLORIDE 5 MG/1
5 TABLET ORAL EVERY 4 HOURS PRN
Status: DISCONTINUED | OUTPATIENT
Start: 2023-04-07 | End: 2023-04-08 | Stop reason: HOSPADM

## 2023-04-07 RX ORDER — TRANEXAMIC ACID 100 MG/ML
500 INJECTION, SOLUTION INTRAVENOUS ONCE
Status: COMPLETED | OUTPATIENT
Start: 2023-04-07 | End: 2023-04-07

## 2023-04-07 RX ADMIN — PHENYLEPHRINE HYDROCHLORIDE 1 DROP: 0.5 SPRAY NASAL at 18:44

## 2023-04-07 RX ADMIN — POTASSIUM CHLORIDE: 149 INJECTION, SOLUTION, CONCENTRATE INTRAVENOUS at 23:01

## 2023-04-07 RX ADMIN — TRANEXAMIC ACID 500 MG: 100 INJECTION INTRAVENOUS at 15:53

## 2023-04-07 ASSESSMENT — ENCOUNTER SYMPTOMS
ALLERGIC/IMMUNOLOGIC NEGATIVE: 1
HEMATOLOGIC/LYMPHATIC NEGATIVE: 1
NEUROLOGICAL NEGATIVE: 1
CARDIOVASCULAR NEGATIVE: 1
ENDOCRINE NEGATIVE: 1
GASTROINTESTINAL NEGATIVE: 1
MUSCULOSKELETAL NEGATIVE: 1
FACIAL SWELLING: 1
EYES NEGATIVE: 1
CONSTITUTIONAL NEGATIVE: 1
RESPIRATORY NEGATIVE: 1

## 2023-04-07 ASSESSMENT — ACTIVITIES OF DAILY LIVING (ADL)
ADLS_ACUITY_SCORE: 35
ADLS_ACUITY_SCORE: 33

## 2023-04-07 NOTE — ED PROVIDER NOTES
History     Chief Complaint   Patient presents with     Nose Bleeds     HPI  Micki Mathew is a 73 year old female who presents for evaluation with epistaxis.  On intake patient reports she has had intermittent symptoms since the night prior.    Patient's medical records show a history of paroxysmal atrial fibrillation, hypertension and hypothyroidism and a history of depression.    Patient was hospitalized 3 days ago after head trauma and facial trauma after a fall.  She suffered nasal bone fractures and did have epistaxis as result of nasal bone fracture.  She was treated for small traumatic subarachnoid hemorrhage.    Patient's prescribed medications were reviewed.       On examination patient reported that since her hospital discharge she has not been able to breathe through her nose.  She also developed bleeding.  She reports that she has no facial pain or pressure.  A nasal clip was applied at the time of arrival and her epistaxis had resolved    Allergies:  Allergies   Allergen Reactions     Lisinopril Cough       Problem List:    Patient Active Problem List    Diagnosis Date Noted     Closed fracture of nasal bone with routine healing 04/05/2023     Priority: Medium     Epistaxis 04/04/2023     Priority: Medium     Facial trauma, initial encounter 04/04/2023     Priority: Medium     Head injury, initial encounter 04/04/2023     Priority: Medium     History of depression 06/22/2022     Priority: Medium     Hyperlipidemia 12/07/2015     Priority: Medium     Paroxysmal atrial fibrillation (H) 12/07/2015     Priority: Medium     Essential hypertension 03/19/2007     Priority: Medium     Formatting of this note might be different from the original.  Hypertension       Hypothyroidism 03/19/2007     Priority: Medium     Formatting of this note might be different from the original.  Hypothyroidism Subclinical          Past Medical History:    Past Medical History:   Diagnosis Date     Atrial fibrillation (H)       Diabetes (H)      Thyroid disease        Past Surgical History:    Past Surgical History:   Procedure Laterality Date     COLONOSCOPY N/A 4/21/2017    Procedure: COMBINED COLONOSCOPY, SINGLE OR MULTIPLE BIOPSY/POLYPECTOMY BY BIOPSY;;  Surgeon: Peng Harding MD;  Location:  GI     COLONOSCOPY N/A 8/16/2018    Procedure: COMBINED COLONOSCOPY, SINGLE OR MULTIPLE BIOPSY/POLYPECTOMY BY BIOPSY;;  Surgeon: Peng Harding MD;  Location:  GI     ENT SURGERY      benign lump throat     ENT SURGERY      tonsils     GYN SURGERY      c section     ORTHOPEDIC SURGERY      left hip replacement       Family History:    No family history on file.    Social History:  Marital Status:   [2]  Social History     Tobacco Use     Smoking status: Never     Smokeless tobacco: Never   Vaping Use     Vaping status: Never Used   Substance Use Topics     Alcohol use: Yes     Comment: very rare use     Drug use: No        Medications:    amoxicillin-clavulanate (AUGMENTIN) 875-125 MG tablet  oxymetazoline (AFRIN) 0.05 % nasal spray  Ascorbic Acid (VITAMIN C PO)  ASPIRIN ADULT LOW STRENGTH PO  atorvastatin (LIPITOR) 20 MG tablet  Calcium-Magnesium-Vitamin D (CALCIUM 500 PO)  Cholecalciferol (VITAMIN D-3 PO)  FOLIC ACID PO  glucosamine-chondroitin 500-400 MG CAPS per capsule  hydrochlorothiazide (HYDRODIURIL) 25 MG tablet  levothyroxine (SYNTHROID/LEVOTHROID) 75 MCG tablet  magnesium 250 MG tablet  metoprolol tartrate (LOPRESSOR) 25 MG tablet  metoprolol tartrate (LOPRESSOR) 50 MG tablet  multivitamin w/minerals (THERA-VIT-M) tablet  Omega-3 Fatty Acids (FISH OIL MAXIMUM STRENGTH PO)  oxyCODONE (ROXICODONE) 5 MG tablet  rivaroxaban ANTICOAGULANT (XARELTO ANTICOAGULANT) 20 MG TABS tablet  sertraline (ZOLOFT) 50 MG tablet  vitamin B complex with vitamin C (STRESS TAB) tablet  vitamin E 400 units TABS          Review of Systems   Constitutional: Negative.    HENT: Positive for facial swelling and nosebleeds.    Eyes: Negative.   "  Respiratory: Negative.    Cardiovascular: Negative.    Gastrointestinal: Negative.    Endocrine: Negative.    Genitourinary: Negative.    Musculoskeletal: Negative.    Skin: Negative.    Allergic/Immunologic: Negative.    Neurological: Negative.    Hematological: Negative.    All other systems reviewed and are negative.      Physical Exam   BP: 130/89  Pulse: 64  Temp: 97.7  F (36.5  C)  Height: 162.6 cm (5' 4\")  Weight: 104.3 kg (230 lb)  SpO2: 98 %      Physical Exam  HENT:      Head: Normocephalic and atraumatic.        Nose: Nasal deformity, mucosal edema and congestion present.      Right Nostril: Occlusion present.      Left Nostril: Epistaxis present.      Right Turbinates: Enlarged and swollen.   Eyes:      Extraocular Movements: Extraocular movements intact.      Pupils: Pupils are equal, round, and reactive to light.   Cardiovascular:      Rate and Rhythm: Normal rate.      Pulses: Normal pulses.      Heart sounds: Normal heart sounds.   Pulmonary:      Effort: No respiratory distress.      Breath sounds: Normal breath sounds. No stridor. No wheezing, rhonchi or rales.   Musculoskeletal:      Cervical back: Normal range of motion and neck supple.   Skin:     Capillary Refill: Capillary refill takes less than 2 seconds.      Coloration: Skin is not jaundiced or pale.      Findings: No bruising, erythema, lesion or rash.   Neurological:      General: No focal deficit present.      Mental Status: She is alert and oriented to person, place, and time.      Cranial Nerves: No cranial nerve deficit.      Sensory: No sensory deficit.      Motor: No weakness.      Coordination: Coordination normal.      Gait: Gait normal.      Deep Tendon Reflexes: Reflexes normal.   Psychiatric:         Mood and Affect: Mood normal.         Behavior: Behavior normal.         Thought Content: Thought content normal.         ED Course                 Procedures              Critical Care time: 30 minutes.               ED " "medications:  Medications   tranexamic acid (CYKLOKAPRON) spray 500 mg (500 mg Both Nostrils $Given by Other 4/7/23 1553)   phenylephrine (ROMANA-SYNEPHRINE) 0.5 % spray 1 drop (1 drop Both Nostrils $Given 4/7/23 1844)       ED Vitals:  Vitals:    04/07/23 1300 04/07/23 1551 04/07/23 1552 04/07/23 1600   BP: 130/89 (!) 150/74  129/70   Pulse: 64 61  62   Temp: 97.7  F (36.5  C)      TempSrc: Oral      SpO2: 98%  96% 98%   Weight: 104.3 kg (230 lb)      Height: 1.626 m (5' 4\")        ED labs and imaging:  Results for orders placed or performed during the hospital encounter of 04/07/23   CT Facial Bones without Contrast     Status: None    Narrative    EXAM: CT FACIAL BONES WITHOUT CONTRAST  LOCATION: [facility]  DATE/TIME: 4/7/2023 4:52 PM    INDICATION: Persistent epistaxis, facial trauma fall 3 days prior; evaluate for for septal hematoma  COMPARISON:  04/04/2023.  TECHNIQUE: Routine CT Maxillofacial without IV contrast. Multiplanar reformats. Dose reduction techniques were used.     FINDINGS:  OSSEOUS STRUCTURES/SOFT TISSUES: Redemonstration of the comminuted, impacted nasal bone fractures with involvement of the osseous nasal septum.  Increased soft tissue fullness at the level of the cartilaginous nasal septum. Multifocal dental decay. Mild   submandibular soft tissue swelling, increased since prior examination.    ORBITAL CONTENTS: Prior bilateral cataract surgery. Visualized portions of the orbits are otherwise unremarkable.    SINUSES: Mild mucosal thickening scattered about the paranasal sinuses.    VISUALIZED INTRACRANIAL CONTENTS: The included intracranial compartment demonstrates mild age related changes.       Impression    IMPRESSION:   1.  Redemonstration of the comminuted, impacted nasal bone fractures with involvement of the osseous nasal septum.   2.  Increased soft tissue tissue fullness of the cartilaginous nasal septum could represent a nasal septal hematoma.  3.  Mild " increased submandibular soft tissue swelling.         Assessments & Plan (with Medical Decision Making)   Assessment Summary and Clinical Impression: 73-year-old female who presented with epistaxis initially intermittent last 24 hours.  Patient has a history of paroxysmal atrial fibrillation not currently on anticoagulation -held after facial trauma 3 days prior.  Patient is typically on rivaroxaban but this has been held since her facial trauma.  CT imaging 3 days prior revealed acute fractures involving nasal bone and nasal septum but no other facial injury or fracture there was layering of hemorrhage in both paranasal sinuses.  Patient's exam today was limited due to degree of edema in the nasal mucosa.  I was able to anesthetize the nostril with lidocaine with epinephrine and TXA.  Bleeding had ceased with placement of a nasal clip.  I was unable to fully examine the nasal mucosa bilaterally due to degree of edema and patient's facial trauma with raccoon eyes on exam.  She was however not able to blow her nose and so repeat CT imaging was obtained to help evaluate for septal hematoma. With CT findings suggesting presence of subdural hematomas clinically suspected after discussion with ENT plan was for urgent outpatient follow-up.    ED course and Plan:  Reviewed the medical record.  Patient was hospitalized 3 days prior after head injury and facial trauma suffering of nasal fracture.  See hospital course from 4/4/23 to 4/5/23-in the medical record.  After attempts at examining the nasal mucosa bilaterally exam was limited due to degree of deep edema post facial trauma.  Bleeding ceased with nasal clip I was able to anesthetize the nasal mucosa with lidocaine with epinephrine and TXA.  I elected to reimage the patient's face to evaluate for interval development of septal hematoma versus other.  CT face revealed redemonstration of comminuted impacted nasal bone fractures with involvement of the posterior nasal  septum.  There is increased soft tissue fullness of the cartilaginous nasal septum which could represent a nasal septal hematoma.  There is increased submandibular soft tissue swelling as well.  See details outlined radiology report.  With concern for nasal septal hematoma on CT I reviewed patient's presentation with ENT given limited exam during ED course with rebleeding.    Spoke with Dr. Stratton at 6.25pm- ENT on-call. We discussed recent blunt facial trauma, CT imaging 3 days prior and again today and clinical exam during her ED course. With CT interpretation suggesting septal hematoma we discussed best next step for care. Dr Stratton reviewed CT imaging and recommended urgent follow-up with Dr Ibarra for re-evaluation with afrin topicalized 3 times a day over the weekend and oral antibiotics  (augmentin twice daily x5 days). We reviewed I am unable to pack the nose due to the degree of swelling in the mid face, nose and nasal mucosa. I sent Dr Ibarra an Epic in-basket message for urgent follow-up for additional definitive care.   Prior to discharge is no active epistaxis patient reported comfort with the plan of care after discussion with her daughter Lucy who later arrived during ED course.    ADDENDUM on 4/7/23-spoke with Dr. Land-ENT on-call after patient be discharged from the ED who requested patient return for plan for more urgent intervention in the OR in a.m. called and left a message for both patient and daughter about the need for return for reevaluation intervention by ENT.  Nursing was able to connect with patient's daughter who plans to bring patient back to the ED for additional care as recommended by ENT. Updated the overnight provider- Dr EYAL Mcdonald at 9pm about proposed plan of care and patient to return to the ED for ongoing    Disclaimer: This note consists of symbols derived from keyboarding, dictation and/or voice recognition software. As a result, there may be errors in the script  that have gone undetected. Please consider this when interpreting information found in this chart.      I have reviewed the nursing notes.    I have reviewed the findings, diagnosis, plan and need for follow up with the patient.           Medical Decision Making  The patient's presentation was of moderate complexity (an acute illness with systemic symptoms).    The patient's evaluation involved:  ordering and/or review of 2 test(s) in this encounter (diagnostic imaging)    The patient's management necessitated moderate risk (prescription drug management including medications given in the ED).        New Prescriptions    AMOXICILLIN-CLAVULANATE (AUGMENTIN) 875-125 MG TABLET    Take 1 tablet by mouth 2 times daily for 5 days    OXYMETAZOLINE (AFRIN) 0.05 % NASAL SPRAY    Spray 2 sprays into both nostrils 3 times daily for 3 days       Final diagnoses:   Closed fracture of nasal bone, initial encounter   Hematoma of nasal septum, initial encounter   Abnormal CT scan - 1.  Redemonstration of the comminuted, impacted nasal bone fractures with involvement of the osseous nasal septum. Increased soft tissue tissue fullness of the cartilaginous nasal septum could represent a nasal septal hematoma.       4/7/2023   [facility] EMERGENCY DEPT     Fausto Rico MD  04/07/23 6087       Fausto Rico MD  04/07/23 3376

## 2023-04-07 NOTE — DISCHARGE INSTRUCTIONS
1) Your fall 3 days ago resulting in fracture in your nose which has subsequently resulted in septal hematoma on CT imaging today.  We discussed the importance of follow-up care in the ear nose and throat clinic.  Plan is for you to apply Afrin to your nose 3 times a day over the weekend for 3 days until follow-up with Dr. Chung his colleagues in ENT clinic-I sent a message with the care team for them to reach out to you for follow-up evaluation.    2) you should also begin taking antibiotics twice a day over the next 5 days pending follow-up with ENT.    3) We discussed care for your nosebleed including applying ice to your face to help with the swelling, try to avoid picking your nose or dabbing her nose with tissue toilet paper if you can.    4) if you develop a nosebleed that does not stop you should apply the nasal clip wait at least 30 minutes if it does not stop he should return to be reevaluated.    5) plan was for you to hold your Xarelto after your facial trauma and fall 3 days ago it is important that you touch base with your primary care provider about when to restart this and discussion with the ENT provider you will be seeing in the next few days.    6) consider taking Tylenol 650 mg every 4 hours as needed for facial pain.  If you have new concerns or symptoms worsen should present to be reevaluated

## 2023-04-07 NOTE — ED TRIAGE NOTES
Nose bleed on/off since last night.  Fell and broke nose on Tuesday.  No active bleeding in triage.  Clamp in place from EMS.     Triage Assessment       Row Name 04/07/23 125       Triage Assessment (Adult)    Airway WDL WDL       Respiratory WDL    Respiratory WDL WDL       Skin Circulation/Temperature WDL    Skin Circulation/Temperature WDL WDL       Cardiac WDL    Cardiac WDL WDL       Peripheral/Neurovascular WDL    Peripheral Neurovascular WDL WDL       Cognitive/Neuro/Behavioral WDL    Cognitive/Neuro/Behavioral WDL WDL

## 2023-04-08 ENCOUNTER — ANESTHESIA (OUTPATIENT)
Dept: SURGERY | Facility: CLINIC | Age: 73
End: 2023-04-08
Payer: COMMERCIAL

## 2023-04-08 ENCOUNTER — ANESTHESIA EVENT (OUTPATIENT)
Dept: SURGERY | Facility: CLINIC | Age: 73
End: 2023-04-08
Payer: COMMERCIAL

## 2023-04-08 VITALS
WEIGHT: 230 LBS | OXYGEN SATURATION: 90 % | DIASTOLIC BLOOD PRESSURE: 82 MMHG | TEMPERATURE: 98.2 F | HEART RATE: 64 BPM | RESPIRATION RATE: 12 BRPM | BODY MASS INDEX: 39.48 KG/M2 | SYSTOLIC BLOOD PRESSURE: 157 MMHG

## 2023-04-08 PROCEDURE — 360N000074 HC SURGERY LEVEL 1, PER MIN: Performed by: OTOLARYNGOLOGY

## 2023-04-08 PROCEDURE — 250N000011 HC RX IP 250 OP 636: Performed by: NURSE ANESTHETIST, CERTIFIED REGISTERED

## 2023-04-08 PROCEDURE — 250N000025 HC SEVOFLURANE, PER MIN: Performed by: OTOLARYNGOLOGY

## 2023-04-08 PROCEDURE — 96361 HYDRATE IV INFUSION ADD-ON: CPT | Performed by: EMERGENCY MEDICINE

## 2023-04-08 PROCEDURE — 250N000009 HC RX 250: Performed by: NURSE ANESTHETIST, CERTIFIED REGISTERED

## 2023-04-08 PROCEDURE — 30020 DRAINAGE OF NOSE LESION: CPT | Performed by: OTOLARYNGOLOGY

## 2023-04-08 PROCEDURE — 272N000001 HC OR GENERAL SUPPLY STERILE: Performed by: OTOLARYNGOLOGY

## 2023-04-08 PROCEDURE — 370N000017 HC ANESTHESIA TECHNICAL FEE, PER MIN: Performed by: OTOLARYNGOLOGY

## 2023-04-08 PROCEDURE — 710N000012 HC RECOVERY PHASE 2, PER MINUTE: Performed by: OTOLARYNGOLOGY

## 2023-04-08 PROCEDURE — 250N000009 HC RX 250: Performed by: OTOLARYNGOLOGY

## 2023-04-08 PROCEDURE — 250N000011 HC RX IP 250 OP 636: Performed by: OTOLARYNGOLOGY

## 2023-04-08 PROCEDURE — 21320 CLSD TX NSL FX W/MNPJ&STABLJ: CPT | Performed by: OTOLARYNGOLOGY

## 2023-04-08 PROCEDURE — 250N000013 HC RX MED GY IP 250 OP 250 PS 637: Performed by: NURSE ANESTHETIST, CERTIFIED REGISTERED

## 2023-04-08 PROCEDURE — 710N000009 HC RECOVERY PHASE 1, LEVEL 1, PER MIN: Performed by: OTOLARYNGOLOGY

## 2023-04-08 RX ORDER — DEXAMETHASONE SODIUM PHOSPHATE 4 MG/ML
INJECTION, SOLUTION INTRA-ARTICULAR; INTRALESIONAL; INTRAMUSCULAR; INTRAVENOUS; SOFT TISSUE PRN
Status: DISCONTINUED | OUTPATIENT
Start: 2023-04-08 | End: 2023-04-08

## 2023-04-08 RX ORDER — FENTANYL CITRATE 50 UG/ML
50 INJECTION, SOLUTION INTRAMUSCULAR; INTRAVENOUS EVERY 5 MIN PRN
Status: DISCONTINUED | OUTPATIENT
Start: 2023-04-08 | End: 2023-04-08 | Stop reason: HOSPADM

## 2023-04-08 RX ORDER — EPHEDRINE SULFATE 50 MG/ML
INJECTION, SOLUTION INTRAMUSCULAR; INTRAVENOUS; SUBCUTANEOUS PRN
Status: DISCONTINUED | OUTPATIENT
Start: 2023-04-08 | End: 2023-04-08

## 2023-04-08 RX ORDER — OXYMETAZOLINE HYDROCHLORIDE 0.05 G/100ML
SPRAY NASAL PRN
Status: DISCONTINUED | OUTPATIENT
Start: 2023-04-08 | End: 2023-04-08 | Stop reason: HOSPADM

## 2023-04-08 RX ORDER — PROPOFOL 10 MG/ML
INJECTION, EMULSION INTRAVENOUS PRN
Status: DISCONTINUED | OUTPATIENT
Start: 2023-04-08 | End: 2023-04-08

## 2023-04-08 RX ORDER — ONDANSETRON 4 MG/1
4 TABLET, ORALLY DISINTEGRATING ORAL EVERY 30 MIN PRN
Status: DISCONTINUED | OUTPATIENT
Start: 2023-04-08 | End: 2023-04-08 | Stop reason: HOSPADM

## 2023-04-08 RX ORDER — DEXAMETHASONE SODIUM PHOSPHATE 4 MG/ML
10 INJECTION, SOLUTION INTRA-ARTICULAR; INTRALESIONAL; INTRAMUSCULAR; INTRAVENOUS; SOFT TISSUE ONCE
Status: COMPLETED | OUTPATIENT
Start: 2023-04-08 | End: 2023-04-08

## 2023-04-08 RX ORDER — ONDANSETRON 4 MG/1
4 TABLET, ORALLY DISINTEGRATING ORAL EVERY 8 HOURS PRN
Qty: 6 TABLET | Refills: 0 | Status: SHIPPED | OUTPATIENT
Start: 2023-04-08

## 2023-04-08 RX ORDER — FENTANYL CITRATE 50 UG/ML
25 INJECTION, SOLUTION INTRAMUSCULAR; INTRAVENOUS EVERY 5 MIN PRN
Status: DISCONTINUED | OUTPATIENT
Start: 2023-04-08 | End: 2023-04-08 | Stop reason: HOSPADM

## 2023-04-08 RX ORDER — EPINEPHRINE 1 MG/ML(1)
AMPUL (ML) INJECTION PRN
Status: DISCONTINUED | OUTPATIENT
Start: 2023-04-08 | End: 2023-04-08 | Stop reason: HOSPADM

## 2023-04-08 RX ORDER — HYDROMORPHONE HCL IN WATER/PF 6 MG/30 ML
0.2 PATIENT CONTROLLED ANALGESIA SYRINGE INTRAVENOUS EVERY 5 MIN PRN
Status: DISCONTINUED | OUTPATIENT
Start: 2023-04-08 | End: 2023-04-08 | Stop reason: HOSPADM

## 2023-04-08 RX ORDER — HYDROMORPHONE HCL IN WATER/PF 6 MG/30 ML
0.4 PATIENT CONTROLLED ANALGESIA SYRINGE INTRAVENOUS EVERY 5 MIN PRN
Status: DISCONTINUED | OUTPATIENT
Start: 2023-04-08 | End: 2023-04-08 | Stop reason: HOSPADM

## 2023-04-08 RX ORDER — ONDANSETRON 2 MG/ML
INJECTION INTRAMUSCULAR; INTRAVENOUS PRN
Status: DISCONTINUED | OUTPATIENT
Start: 2023-04-08 | End: 2023-04-08

## 2023-04-08 RX ORDER — SODIUM CHLORIDE, SODIUM LACTATE, POTASSIUM CHLORIDE, CALCIUM CHLORIDE 600; 310; 30; 20 MG/100ML; MG/100ML; MG/100ML; MG/100ML
INJECTION, SOLUTION INTRAVENOUS CONTINUOUS
Status: DISCONTINUED | OUTPATIENT
Start: 2023-04-08 | End: 2023-04-08 | Stop reason: HOSPADM

## 2023-04-08 RX ORDER — PROPOFOL 10 MG/ML
INJECTION, EMULSION INTRAVENOUS CONTINUOUS PRN
Status: DISCONTINUED | OUTPATIENT
Start: 2023-04-08 | End: 2023-04-08

## 2023-04-08 RX ORDER — ONDANSETRON 2 MG/ML
4 INJECTION INTRAMUSCULAR; INTRAVENOUS EVERY 30 MIN PRN
Status: DISCONTINUED | OUTPATIENT
Start: 2023-04-08 | End: 2023-04-08 | Stop reason: HOSPADM

## 2023-04-08 RX ORDER — OXYCODONE HYDROCHLORIDE 5 MG/1
5 TABLET ORAL EVERY 4 HOURS PRN
Status: DISCONTINUED | OUTPATIENT
Start: 2023-04-08 | End: 2023-04-08 | Stop reason: HOSPADM

## 2023-04-08 RX ORDER — FENTANYL CITRATE 50 UG/ML
INJECTION, SOLUTION INTRAMUSCULAR; INTRAVENOUS PRN
Status: DISCONTINUED | OUTPATIENT
Start: 2023-04-08 | End: 2023-04-08

## 2023-04-08 RX ORDER — CEFAZOLIN SODIUM 1 G/3ML
INJECTION, POWDER, FOR SOLUTION INTRAMUSCULAR; INTRAVENOUS PRN
Status: DISCONTINUED | OUTPATIENT
Start: 2023-04-08 | End: 2023-04-08

## 2023-04-08 RX ADMIN — Medication 10 MG: at 07:04

## 2023-04-08 RX ADMIN — ROCURONIUM BROMIDE 30 MG: 50 INJECTION, SOLUTION INTRAVENOUS at 07:00

## 2023-04-08 RX ADMIN — PROPOFOL 200 MCG/KG/MIN: 10 INJECTION, EMULSION INTRAVENOUS at 07:00

## 2023-04-08 RX ADMIN — HYDROMORPHONE HYDROCHLORIDE 0.2 MG: 0.2 INJECTION, SOLUTION INTRAMUSCULAR; INTRAVENOUS; SUBCUTANEOUS at 08:36

## 2023-04-08 RX ADMIN — FENTANYL CITRATE 100 MCG: 50 INJECTION, SOLUTION INTRAMUSCULAR; INTRAVENOUS at 07:13

## 2023-04-08 RX ADMIN — CEFAZOLIN 1 G: 1 INJECTION, POWDER, FOR SOLUTION INTRAMUSCULAR; INTRAVENOUS at 07:00

## 2023-04-08 RX ADMIN — HYDROMORPHONE HYDROCHLORIDE 0.2 MG: 0.2 INJECTION, SOLUTION INTRAMUSCULAR; INTRAVENOUS; SUBCUTANEOUS at 08:55

## 2023-04-08 RX ADMIN — ONDANSETRON 4 MG: 2 INJECTION INTRAMUSCULAR; INTRAVENOUS at 07:13

## 2023-04-08 RX ADMIN — HYDROMORPHONE HYDROCHLORIDE 0.2 MG: 0.2 INJECTION, SOLUTION INTRAMUSCULAR; INTRAVENOUS; SUBCUTANEOUS at 08:24

## 2023-04-08 RX ADMIN — OXYCODONE HYDROCHLORIDE 5 MG: 5 TABLET ORAL at 08:47

## 2023-04-08 RX ADMIN — DEXAMETHASONE SODIUM PHOSPHATE 10 MG: 4 INJECTION, SOLUTION INTRA-ARTICULAR; INTRALESIONAL; INTRAMUSCULAR; INTRAVENOUS; SOFT TISSUE at 07:00

## 2023-04-08 RX ADMIN — MIDAZOLAM 2 MG: 1 INJECTION INTRAMUSCULAR; INTRAVENOUS at 06:54

## 2023-04-08 RX ADMIN — DEXAMETHASONE SODIUM PHOSPHATE 10 MG: 4 INJECTION, SOLUTION INTRA-ARTICULAR; INTRALESIONAL; INTRAMUSCULAR; INTRAVENOUS; SOFT TISSUE at 08:01

## 2023-04-08 RX ADMIN — FENTANYL CITRATE 50 MCG: 50 INJECTION INTRAMUSCULAR; INTRAVENOUS at 08:00

## 2023-04-08 RX ADMIN — FENTANYL CITRATE 50 MCG: 50 INJECTION INTRAMUSCULAR; INTRAVENOUS at 08:05

## 2023-04-08 RX ADMIN — PROPOFOL 150 MG: 10 INJECTION, EMULSION INTRAVENOUS at 06:59

## 2023-04-08 ASSESSMENT — ACTIVITIES OF DAILY LIVING (ADL)
ADLS_ACUITY_SCORE: 35

## 2023-04-08 ASSESSMENT — ENCOUNTER SYMPTOMS: DYSRHYTHMIAS: 1

## 2023-04-08 NOTE — ANESTHESIA CARE TRANSFER NOTE
Patient: Micki Mathew    Procedure: Procedure(s):  CLOSED REDUCTION, FRACTURE, NASAL BONE and  incision and drainage of septal hematoma       Diagnosis: Hematoma of nasal septum, initial encounter [S00.33XA]  Closed fracture of nasal bone, initial encounter [S02.2XXA]  Diagnosis Additional Information: No value filed.    Anesthesia Type:   General     Note:    Oropharynx: oropharynx clear of all foreign objects  Level of Consciousness: awake  Oxygen Supplementation: face mask    Independent Airway: airway patency satisfactory and stable    Vital Signs Stable: post-procedure vital signs reviewed and stable  Report to RN Given: handoff report given  Patient transferred to: PACU    Handoff Report: Identifed the Patient, Identified the Reponsible Provider, Reviewed the pertinent medical history, Discussed the surgical course, Reviewed Intra-OP anesthesia mangement and issues during anesthesia, Set expectations for post-procedure period and Allowed opportunity for questions and acknowledgement of understanding      Vitals:  Vitals Value Taken Time   BP     Temp     Pulse     Resp     SpO2         Electronically Signed By: Amarilis Castañeda CRNA, APRN CRNA  April 8, 2023  7:49 AM

## 2023-04-08 NOTE — OP NOTE
OTOLARYNGOLOGY OPERATIVE NOTE    SURGEON: Waldemar Land MD     ASSISTANT: none     PREOPERATIVE DIAGNOSES:   1. Septal hematoma  2. Comminuted nasal fracture closed.    POSTOPERATIVE DIAGNOSES:   Same    PROCEDURE:   1. Incision and drainage of septal hematoma  2. Closed reduction of nasal fracture with stabilization using Denver splint.    INDICATIONS: As above.     SURGICAL FINDINGS:   As above    BRIEF HISTORY: Patient has a history of nasal fracture and septal hematoma. The patient understands the risks and benefits of surgery, limitations, complications including but not limited to bleeding, infection, nasal septum perforation, recurrence of symptoms, need for additional procedures, need for repeat procedures, chronic epistaxis, risks related to anesthesia amongst others. Wishes surgery and now fully agrees to it.     DESCRIPTION OF PROCEDURE: The patient is taken to the operating room, placed under general endotracheal anesthetic, appropriately positioned, prepped and draped. We examined the nose, saw that indeed she is quite deflected to the right  Side nasal bony dorsum as a result of the fracture.   Nasal septal hematoma is also appreciated more to the right side.   Septal hematoma was evacuated by making right septal incision and suctioning the blood out.  Then I used Boyes elevator to reduce nasal fracture.  Aftere topical  Control of hemostasis intanasaly  I placed Kwong  splints to stabilize the septum and secure them with a 3-0 nylon suture.  Then Denver splint was applied.The patient tolerated the procedure well with about 20mL blood loss. Extubated in the OR, taken to recovery in stable condition.   WALDEMAR LAND MD

## 2023-04-08 NOTE — ED NOTES
[facility]   Admission Handoff    The patient is Micki Mathew, 73 year old who arrived in the ED by CAR from home with a complaint of Nasal Injury (Will be coming back to ED tonight for surgery tomorrow morning for nasal repair )  . The patient's current symptoms are new and during this time the symptoms have remained the same. In the ED, patient was diagnosed with   Final diagnoses:   Hematoma of nasal septum, initial encounter         Needed?: No    Allergies:    Allergies   Allergen Reactions    Lisinopril Cough       Past Medical Hx:   Past Medical History:   Diagnosis Date    Atrial fibrillation (H)     Diabetes (H)     MD type 2    Thyroid disease        Initial vitals were: BP: (!) 150/77  Pulse: 67  Temp: 98.1  F (36.7  C)  Resp: 20  Weight: 104.3 kg (230 lb)  SpO2: 93 %   Recent vital Signs: BP (!) 150/77   Pulse 67   Temp 98.3  F (36.8  C) (Oral)   Resp 20   Wt 104.3 kg (230 lb)   SpO2 93%   BMI 39.48 kg/m      Elimination Status: Continent: Yes     Activity Level: SBA    Fall Status: Reason for falls risk:  Mobility  patient and family education    Baseline Mental status: WDL  Current Mental Status changes: at basesline    Infection present or suspected this encounter: no  Sepsis suspected: No    Isolation type: None    Bariatric equipment needed?: No    In the ED these meds were given:   Medications   hydrochlorothiazide (HYDRODIURIL) tablet 25 mg (25 mg Oral Not Given 4/7/23 2258)   levothyroxine (SYNTHROID/LEVOTHROID) tablet 75 mcg (has no administration in time range)   metoprolol tartrate (LOPRESSOR) tablet 50 mg (has no administration in time range)   metoprolol tartrate (LOPRESSOR) tablet 25 mg (has no administration in time range)   oxyCODONE (ROXICODONE) tablet 5 mg (has no administration in time range)   sertraline (ZOLOFT) tablet 50 mg (50 mg Oral Not Given 4/7/23 2257)   dextrose 5% and 0.45% NaCl + KCl 10 meq/L infusion ( Intravenous $New Bag  4/7/23 8021)       Drips running?  No    Home pump  No    Current LDAs: Peripheral IV: Site LFA; Gauge 20 D5W/0.45 NaCl     Results:   Labs/Imaging  Ordered and Resulted from Time of ED Arrival Up to the Time of Departure from the ED  Results for orders placed or performed during the hospital encounter of 04/07/23 (from the past 24 hour(s))   CT Facial Bones without Contrast    Narrative    EXAM: CT FACIAL BONES WITHOUT CONTRAST  LOCATION: [facility]  DATE/TIME: 4/7/2023 4:52 PM    INDICATION: Persistent epistaxis, facial trauma fall 3 days prior; evaluate for for septal hematoma  COMPARISON:  04/04/2023.  TECHNIQUE: Routine CT Maxillofacial without IV contrast. Multiplanar reformats. Dose reduction techniques were used.     FINDINGS:  OSSEOUS STRUCTURES/SOFT TISSUES: Redemonstration of the comminuted, impacted nasal bone fractures with involvement of the osseous nasal septum.  Increased soft tissue fullness at the level of the cartilaginous nasal septum. Multifocal dental decay. Mild   submandibular soft tissue swelling, increased since prior examination.    ORBITAL CONTENTS: Prior bilateral cataract surgery. Visualized portions of the orbits are otherwise unremarkable.    SINUSES: Mild mucosal thickening scattered about the paranasal sinuses.    VISUALIZED INTRACRANIAL CONTENTS: The included intracranial compartment demonstrates mild age related changes.       Impression    IMPRESSION:   1.  Redemonstration of the comminuted, impacted nasal bone fractures with involvement of the osseous nasal septum.   2.  Increased soft tissue tissue fullness of the cartilaginous nasal septum could represent a nasal septal hematoma.  3.  Mild increased submandibular soft tissue swelling.       For the majority of the shift this patient's behavior was Green     Cardiac Rhythm: N/A  Pt needs tele? No  Skin/wound Issues: None    Code Status: Full Code    Pain control: good    Nausea control:  good    Abnormal labs/tests/findings requiring intervention: None    Patient tested for COVID 19 prior to admission: NO     OBS brochure/video discussed/provided to patient/family: N/A     Family present during ED course? No     Family Comments/Social Situation comments: Daughter went home for night     Tasks needing completion: None    Stephanie Concepcion RN

## 2023-04-08 NOTE — DISCHARGE INSTRUCTIONS
Same Day Surgery Discharge Instructions  Special Precautions After Surgery - Adult    It is not unusual to feel lightheaded or faint, up to 24 hours after surgery or while taking pain medication.  If you have these symptoms; sit for a few minutes before standing and have someone assist you when getting up.  You should rest and relax for the next 24 hours and must have someone stay with you for at least 24 hours after your discharge.  DO NOT DRIVE any vehicle or operate mechanical equipment for 24 hours following the end of your surgery.  DO NOT DRIVE while taking narcotic pain medications that have been prescribed by your physician.  If you had a limb operated on, you must be able to use it fully to drive.  DO NOT drink alcoholic beverages for 24 hours following surgery or while taking prescription pain medication.  Drink clear liquids (apple juice, ginger ale, broth, 7-Up, etc.).  Progress to your regular diet as you feel able.  Any questions call your physician and do not make important decisions for 24 hours.    Nausea and Vomiting: Nausea and vomiting can occur any time after receiving anesthesia. If you experience nausea and vomiting we encourage you to move to a clear liquid diet and advance your diet as tolerated. If nausea and vomiting do not improve within 12 hours please call the surgeon or present to the Emergency department.     Break-through Bleeding: If your experience bleeding from your surgical site apply pressure and additional dressing per nurse instruction. For simple problems such as a saturated dressing, you may need to reinforce the dressing with more gauze and tape and put slight pressure on the site. If bleeding does not subside contact the surgeon or present to the Emergency Department.    Post-op Infection: If you develop a fever of 100.4 or greater, have pus like drainage, redness, swelling or severe pain at the surgical site not alleviated with pain medications; please  contact the surgeon or present to the Emergency Department.     Medications:  OK to take tylenol at anytime  OK to take ibuprofen at anytime unless instructed not to by a physician while taking blood thinners.  __________________________________________________________________________________________________________________________________  IMPORTANT NUMBERS:    [facility] [place]  [phone], [phone]  Pharmacy:  [phone]  Same Day Surgery:  [phone], for general post-op questions call Monday - Thursday until 8:30 p.m., Fridays until 6:00 p.m.  Nurse Advice Line:  [phone]                                                                         [facility]:  [phone]
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
## 2023-04-08 NOTE — ANESTHESIA PREPROCEDURE EVALUATION
Anesthesia Pre-Procedure Evaluation    Patient: Micki Mathew   MRN: [id number] : 1950        Procedure : Procedure(s):  CLOSED REDUCTION, FRACTURE, NASAL BONE and  incision and drainage of septal hematoma          Past Medical History:   Diagnosis Date     Atrial fibrillation (H)      Diabetes (H)     MD type 2     Thyroid disease       Past Surgical History:   Procedure Laterality Date     COLONOSCOPY N/A 2017    Procedure: COMBINED COLONOSCOPY, SINGLE OR MULTIPLE BIOPSY/POLYPECTOMY BY BIOPSY;;  Surgeon: Peng Harding MD;  Location:  GI     COLONOSCOPY N/A 2018    Procedure: COMBINED COLONOSCOPY, SINGLE OR MULTIPLE BIOPSY/POLYPECTOMY BY BIOPSY;;  Surgeon: Peng Harding MD;  Location:  GI     ENT SURGERY      benign lump throat     ENT SURGERY      tonsils     GYN SURGERY      c section     ORTHOPEDIC SURGERY      left hip replacement      Allergies   Allergen Reactions     Lisinopril Cough      Social History     Tobacco Use     Smoking status: Never     Smokeless tobacco: Never   Vaping Use     Vaping status: Never Used   Substance Use Topics     Alcohol use: Yes     Comment: very rare use      Wt Readings from Last 1 Encounters:   23 104.3 kg (230 lb)        Anesthesia Evaluation   Pt has had prior anesthetic.         ROS/MED HX  ENT/Pulmonary:       Neurologic:       Cardiovascular:     (+) Dyslipidemia hypertension-----dysrhythmias, a-fib,     METS/Exercise Tolerance:     Hematologic:       Musculoskeletal:       GI/Hepatic:       Renal/Genitourinary:       Endo:     (+) type II DM, thyroid problem,     Psychiatric/Substance Use:       Infectious Disease:       Malignancy:       Other:            Physical Exam    Airway  airway exam normal           Respiratory Devices and Support         Dental       (+) Minor Abnormalities - some fillings, tiny chips      Cardiovascular   cardiovascular exam normal          Pulmonary   pulmonary exam normal                OUTSIDE  LABS:  CBC:   Lab Results   Component Value Date    WBC 12.5 (H) 04/04/2023    HGB 13.8 04/04/2023    HGB 14.9 12/05/2008    HCT 41.6 04/04/2023     04/04/2023     BMP:   Lab Results   Component Value Date     04/04/2023    POTASSIUM 3.3 (L) 04/04/2023    CHLORIDE 97 (L) 04/04/2023    CO2 29 04/04/2023    BUN 19.5 04/04/2023    CR 0.97 (H) 04/04/2023     (H) 04/04/2023    GLC 92 12/05/2008     COAGS:   Lab Results   Component Value Date    INR 1.17 (H) 04/04/2023     POC:   Lab Results   Component Value Date     (H) 12/05/2008     HEPATIC: No results found for: ALBUMIN, PROTTOTAL, ALT, AST, GGT, ALKPHOS, BILITOTAL, BILIDIRECT, CARY  OTHER:   Lab Results   Component Value Date    ADAM 10.2 04/04/2023       Anesthesia Plan    ASA Status:  2   NPO Status:  NPO Appropriate    Anesthesia Type: General.     - Airway: ETT   Induction: Intravenous.   Maintenance: Balanced.        Consents    Anesthesia Plan(s) and associated risks, benefits, and realistic alternatives discussed. Questions answered and patient/representative(s) expressed understanding.    - Discussed:     - Discussed with:  Patient         Postoperative Care            Comments:                Amarilis Castañeda CRNA, APRN [place]

## 2023-04-08 NOTE — ED TRIAGE NOTES
Pt here for surgery in the AM.       Triage Assessment     Row Name 04/07/23 5014       Triage Assessment (Adult)    Airway WDL WDL       Respiratory WDL    Respiratory WDL WDL       Skin Circulation/Temperature WDL    Skin Circulation/Temperature WDL WDL       Cardiac WDL    Cardiac WDL WDL       Peripheral/Neurovascular WDL    Peripheral Neurovascular WDL WDL       Cognitive/Neuro/Behavioral WDL    Cognitive/Neuro/Behavioral WDL WDL

## 2023-04-08 NOTE — ED PROVIDER NOTES
History     Chief Complaint   Patient presents with     Nasal Injury     Will be coming back to ED tonight for surgery tomorrow morning for nasal repair      HPI  Micki Mathew is a 73 year old female who presents for nasal swelling and pain.  The patient has known septal hematoma.  She fell 3 days ago and suffered nasal fracture.  She was seen in the emergency department, I reviewed her emergency department note from 4/4/2023, she was subsequently admitted to the hospital, I reviewed her discharge summary from 4/5/2023.  She was seen her earlier today, I reviewed the note from Dr. Rico from 4/7/2023, I also spoke with him regarding this patient.    Unfortunate the patient has had more swelling in her nose, septal hematoma seen on imaging.  Initial consultation with ENT he was discharged home with outpatient follow-up, however after further conversation with ENT they decided that it would be best to change course and do surgery for the patient in the morning and so she had been unfortunately discharged earlier and was called and told to come back so she could be ready for surgery at 6 AM.  Breathing comfortably.  No nausea or vomiting    Allergies:  Allergies   Allergen Reactions     Lisinopril Cough       Problem List:    Patient Active Problem List    Diagnosis Date Noted     Closed fracture of nasal bone with routine healing 04/05/2023     Priority: Medium     Epistaxis 04/04/2023     Priority: Medium     Facial trauma, initial encounter 04/04/2023     Priority: Medium     Head injury, initial encounter 04/04/2023     Priority: Medium     History of depression 06/22/2022     Priority: Medium     Hyperlipidemia 12/07/2015     Priority: Medium     Paroxysmal atrial fibrillation (H) 12/07/2015     Priority: Medium     Essential hypertension 03/19/2007     Priority: Medium     Formatting of this note might be different from the original.  Hypertension       Hypothyroidism 03/19/2007     Priority: Medium      Formatting of this note might be different from the original.  Hypothyroidism Subclinical          Past Medical History:    Past Medical History:   Diagnosis Date     Atrial fibrillation (H)      Diabetes (H)      Thyroid disease        Past Surgical History:    Past Surgical History:   Procedure Laterality Date     COLONOSCOPY N/A 4/21/2017    Procedure: COMBINED COLONOSCOPY, SINGLE OR MULTIPLE BIOPSY/POLYPECTOMY BY BIOPSY;;  Surgeon: Peng Harding MD;  Location:  GI     COLONOSCOPY N/A 8/16/2018    Procedure: COMBINED COLONOSCOPY, SINGLE OR MULTIPLE BIOPSY/POLYPECTOMY BY BIOPSY;;  Surgeon: Peng Harding MD;  Location:  GI     ENT SURGERY      benign lump throat     ENT SURGERY      tonsils     GYN SURGERY      c section     ORTHOPEDIC SURGERY      left hip replacement       Family History:    No family history on file.    Social History:  Marital Status:   [4]  Social History     Tobacco Use     Smoking status: Never     Smokeless tobacco: Never   Vaping Use     Vaping status: Never Used   Substance Use Topics     Alcohol use: Yes     Comment: very rare use     Drug use: No        Medications:    amoxicillin-clavulanate (AUGMENTIN) 875-125 MG tablet  Ascorbic Acid (VITAMIN C PO)  ASPIRIN ADULT LOW STRENGTH PO  atorvastatin (LIPITOR) 20 MG tablet  Calcium-Magnesium-Vitamin D (CALCIUM 500 PO)  Cholecalciferol (VITAMIN D-3 PO)  FOLIC ACID PO  glucosamine-chondroitin 500-400 MG CAPS per capsule  hydrochlorothiazide (HYDRODIURIL) 25 MG tablet  levothyroxine (SYNTHROID/LEVOTHROID) 75 MCG tablet  magnesium 250 MG tablet  metoprolol tartrate (LOPRESSOR) 25 MG tablet  metoprolol tartrate (LOPRESSOR) 50 MG tablet  multivitamin w/minerals (THERA-VIT-M) tablet  Omega-3 Fatty Acids (FISH OIL MAXIMUM STRENGTH PO)  oxyCODONE (ROXICODONE) 5 MG tablet  oxymetazoline (AFRIN) 0.05 % nasal spray  rivaroxaban ANTICOAGULANT (XARELTO ANTICOAGULANT) 20 MG TABS tablet  sertraline (ZOLOFT) 50 MG tablet  vitamin B complex  with vitamin C (STRESS TAB) tablet  vitamin E 400 units TABS          Review of Systems    Physical Exam   BP: (!) 150/77  Pulse: 67  Temp: 98.1  F (36.7  C)  Resp: 20  Weight: 104.3 kg (230 lb)  SpO2: 93 %      Physical Exam  Constitutional:       General: She is not in acute distress.     Appearance: She is well-developed. She is not diaphoretic.   HENT:      Head: Normocephalic and atraumatic.      Comments: Ecchymosis and swelling of the nose.     Nose:      Right Nostril: Septal hematoma present.      Comments: Blood clot in the back of the throat.  Eyes:      General: No scleral icterus.     Comments: Bilateral periorbital ecchymosis.   Musculoskeletal:      Cervical back: Normal range of motion and neck supple.   Skin:     General: Skin is warm and dry.      Coloration: Skin is not pale.      Findings: No erythema or rash.   Neurological:      Mental Status: She is alert and oriented to person, place, and time.         ED Course                 Procedures              Critical Care time:  none               Results for orders placed or performed during the hospital encounter of 04/07/23 (from the past 24 hour(s))   CT Facial Bones without Contrast    Narrative    EXAM: CT FACIAL BONES WITHOUT CONTRAST  LOCATION: [facility]  DATE/TIME: 4/7/2023 4:52 PM    INDICATION: Persistent epistaxis, facial trauma fall 3 days prior; evaluate for for septal hematoma  COMPARISON:  04/04/2023.  TECHNIQUE: Routine CT Maxillofacial without IV contrast. Multiplanar reformats. Dose reduction techniques were used.     FINDINGS:  OSSEOUS STRUCTURES/SOFT TISSUES: Redemonstration of the comminuted, impacted nasal bone fractures with involvement of the osseous nasal septum.  Increased soft tissue fullness at the level of the cartilaginous nasal septum. Multifocal dental decay. Mild   submandibular soft tissue swelling, increased since prior examination.    ORBITAL CONTENTS: Prior bilateral cataract  surgery. Visualized portions of the orbits are otherwise unremarkable.    SINUSES: Mild mucosal thickening scattered about the paranasal sinuses.    VISUALIZED INTRACRANIAL CONTENTS: The included intracranial compartment demonstrates mild age related changes.       Impression    IMPRESSION:   1.  Redemonstration of the comminuted, impacted nasal bone fractures with involvement of the osseous nasal septum.   2.  Increased soft tissue tissue fullness of the cartilaginous nasal septum could represent a nasal septal hematoma.  3.  Mild increased submandibular soft tissue swelling.       Medications   hydrochlorothiazide (HYDRODIURIL) tablet 25 mg (25 mg Oral Not Given 4/7/23 2688)   levothyroxine (SYNTHROID/LEVOTHROID) tablet 75 mcg (has no administration in time range)   metoprolol tartrate (LOPRESSOR) tablet 50 mg (has no administration in time range)   metoprolol tartrate (LOPRESSOR) tablet 25 mg (has no administration in time range)   oxyCODONE (ROXICODONE) tablet 5 mg (has no administration in time range)   sertraline (ZOLOFT) tablet 50 mg (50 mg Oral Not Given 4/7/23 2257)   dextrose 5% and 0.45% NaCl + KCl 10 meq/L infusion ( Intravenous Rate/Dose Verify 4/8/23 5219)       Assessments & Plan (with Medical Decision Making)   73-year-old female with septal hematoma from trauma several days ago.  She will be boarded in the emergency department overnight.  We have updated ENT that she is here and plan is to go to the operating room for surgery at 6 AM.    I have reviewed the nursing notes.    I have reviewed the findings, diagnosis, plan and need for follow up with the patient.           Medical Decision Making  The patient's presentation was of high complexity (an acute health issue posing potential threat to life or bodily function).    The patient's evaluation involved:  review of external note(s) from 3+ sources (see separate area of note for details)  review of 2 test result(s) ordered prior to this encounter  (see separate area of note for details)    The patient's management necessitated high risk (a decision regarding emergency major procedure (went to the OR with operative service)).        New Prescriptions    No medications on file       Final diagnoses:   Hematoma of nasal septum, initial encounter       4/7/2023   [facility] EMERGENCY DEPT     Willy Mcdonald MD  04/08/23 0515

## 2023-04-08 NOTE — ED PROVIDER NOTES
Emergency Department Patient Sign-out       Brief HPI:  This is a 73 year old female signed out to me by Dr. Mcdonald .  See initial ED Provider note for details of the presentation.     Septal hematoma pending surgery.      Significant Events prior to my assuming care:       Exam:   Patient Vitals for the past 24 hrs:   BP Temp Temp src Pulse Resp SpO2 Weight   04/08/23 0539 (!) 162/78 97.7  F (36.5  C) Oral 68 18 97 % 104.3 kg (230 lb)   04/08/23 0330 (!) 166/87 97.7  F (36.5  C) Oral 69 18 97 % --   04/08/23 0014 (!) 169/83 -- -- 64 -- 97 % --   04/08/23 0011 (!) 169/83 -- -- 65 18 97 % --   04/08/23 0004 (!) 174/94 -- -- -- -- -- --   04/07/23 2226 -- 98.3  F (36.8  C) Oral -- -- -- --   04/07/23 2217 (!) 150/77 98.1  F (36.7  C) Oral 67 20 93 % 104.3 kg (230 lb)           ED RESULTS:   No results found for this visit on 04/07/23 (from the past 24 hour(s)).    ED MEDICATIONS:   Medications   hydrochlorothiazide (HYDRODIURIL) tablet 25 mg (25 mg Oral Not Given 4/7/23 2258)   levothyroxine (SYNTHROID/LEVOTHROID) tablet 75 mcg (has no administration in time range)   metoprolol tartrate (LOPRESSOR) tablet 50 mg (has no administration in time range)   metoprolol tartrate (LOPRESSOR) tablet 25 mg (has no administration in time range)   oxyCODONE (ROXICODONE) tablet 5 mg (has no administration in time range)   sertraline (ZOLOFT) tablet 50 mg (50 mg Oral Not Given 4/7/23 2257)   dextrose 5% and 0.45% NaCl + KCl 10 meq/L infusion ( Intravenous Rate/Dose Verify 4/8/23 0542)         Impression:    ICD-10-CM    1. Hematoma of nasal septum, initial encounter  S00.33XA           Plan:    Pending studies include none.  Pending septal hematoma surgery planned for this morning.      MD Teddy Elena Scott J, MD  04/08/23 0615

## 2023-04-08 NOTE — ED PROVIDER NOTES
Emergency Department Patient Sign-out       Brief HPI:  This is a 73 year old female signed out to me by Dr. Mcdonald .  See initial ED Provider note for details of the presentation.           Significant Events prior to my assuming care:       Exam:   Patient Vitals for the past 24 hrs:   BP Temp Temp src Pulse Resp SpO2 Weight   04/08/23 0539 (!) 162/78 97.7  F (36.5  C) Oral 68 18 97 % 104.3 kg (230 lb)   04/08/23 0330 (!) 166/87 97.7  F (36.5  C) Oral 69 18 97 % --   04/08/23 0014 (!) 169/83 -- -- 64 -- 97 % --   04/08/23 0011 (!) 169/83 -- -- 65 18 97 % --   04/08/23 0004 (!) 174/94 -- -- -- -- -- --   04/07/23 2226 -- 98.3  F (36.8  C) Oral -- -- -- --   04/07/23 2217 (!) 150/77 98.1  F (36.7  C) Oral 67 20 93 % 104.3 kg (230 lb)           ED RESULTS:   No results found for this visit on 04/07/23 (from the past 24 hour(s)).    ED MEDICATIONS:   Medications   hydrochlorothiazide (HYDRODIURIL) tablet 25 mg (25 mg Oral Not Given 4/7/23 2258)   levothyroxine (SYNTHROID/LEVOTHROID) tablet 75 mcg (has no administration in time range)   metoprolol tartrate (LOPRESSOR) tablet 50 mg (has no administration in time range)   metoprolol tartrate (LOPRESSOR) tablet 25 mg (has no administration in time range)   oxyCODONE (ROXICODONE) tablet 5 mg (has no administration in time range)   sertraline (ZOLOFT) tablet 50 mg (50 mg Oral Not Given 4/7/23 2257)   dextrose 5% and 0.45% NaCl + KCl 10 meq/L infusion ( Intravenous Rate/Dose Verify 4/8/23 0542)         Impression:    ICD-10-CM    1. Hematoma of nasal septum, initial encounter  S00.33XA           Plan:    Pending studies include none  sent to OR.        MD Teddy Elena Scott J, MD  04/08/23 4771

## 2023-04-08 NOTE — ED NOTES
Was able to get a hold of daughter Lucy to bring mom back to ED for admission and surgery in morning  Daughter will bring patient back to ED

## 2023-04-12 NOTE — ANESTHESIA POSTPROCEDURE EVALUATION
Patient: Micki Mathew    Procedure: Procedure(s):  CLOSED REDUCTION, FRACTURE, NASAL BONE and  incision and drainage of septal hematoma       Anesthesia Type:  General    Note:  Disposition: Outpatient   Postop Pain Control: Uneventful            Sign Out: Well controlled pain   PONV: No   Neuro/Psych: Uneventful            Sign Out: Acceptable/Baseline neuro status   Airway/Respiratory: Uneventful            Sign Out: Acceptable/Baseline resp. status   CV/Hemodynamics: Uneventful            Sign Out: Acceptable CV status; No obvious hypovolemia; No obvious fluid overload   Other NRE:    DID A NON-ROUTINE EVENT OCCUR?            Last vitals:  Vitals Value Taken Time   /85 04/08/23 0900   Temp 36.8  C (98.2  F) 04/08/23 0845   Pulse 62 04/08/23 0900   Resp 12 04/08/23 0900   SpO2 96 % 04/08/23 0900       Electronically Signed By: MOOSE Diop CRNA  April 12, 2023  10:47 AM

## 2023-04-12 NOTE — PROGRESS NOTES
Chief Complaint   Patient presents with     Ent Problem     Recheck nose     History of Present Illness  Micki Mathew is a 73 year old female who presents today for follow-up.  The patient sustained facial trauma secondary to fall on 4/4/2023.  She was on anticoagulation and had significant epistaxis.  She developed a significant nasal fracture and septal hematoma.  She had persistent bleeding and represented to the emergency department on 4/7/2023.  She was taken to the operating room on 4/8/2023 for closed reduction of nasal fracture and incision and drainage of her septal hematoma with placement of nasal splints.      The patient has done well postoperatively with minimal pain, unless he manipulates the nose which is tender.  The patient does report some congestion but no significant nasal bleeding.  They presents today for nasal splint removal.    Past Medical History  Patient Active Problem List   Diagnosis     Epistaxis     Facial trauma, initial encounter     Head injury, initial encounter     Closed fracture of nasal bone with routine healing     Essential hypertension     History of depression     Hypothyroidism     Hyperlipidemia     Paroxysmal atrial fibrillation (H)     Current Medications    Current Outpatient Medications:      Ascorbic Acid (VITAMIN C PO), , Disp: , Rfl:      ASPIRIN ADULT LOW STRENGTH PO, Take 81 mg by mouth daily, Disp: , Rfl:      atorvastatin (LIPITOR) 20 MG tablet, Take 20 mg by mouth daily, Disp: , Rfl:      Calcium-Magnesium-Vitamin D (CALCIUM 500 PO), , Disp: , Rfl:      Cholecalciferol (VITAMIN D-3 PO), , Disp: , Rfl:      FOLIC ACID PO, Take 1 mg by mouth daily, Disp: , Rfl:      glucosamine-chondroitin 500-400 MG CAPS per capsule, Take 1 capsule by mouth daily, Disp: , Rfl:      hydrochlorothiazide (HYDRODIURIL) 25 MG tablet, Take 1 tablet by mouth daily (with dinner), Disp: , Rfl:      levothyroxine (SYNTHROID/LEVOTHROID) 75 MCG tablet, Take 75 mcg by mouth daily, Disp:  , Rfl:      magnesium 250 MG tablet, Take 1 tablet by mouth daily, Disp: , Rfl:      metoprolol tartrate (LOPRESSOR) 25 MG tablet, Take 25 mg by mouth 2 times daily Take with 50mg total 75 mg twice daily, Disp: , Rfl:      metoprolol tartrate (LOPRESSOR) 50 MG tablet, Take 50 mg by mouth 2 times daily Take with 25 mg total 75 mg twice daily, Disp: , Rfl:      multivitamin w/minerals (THERA-VIT-M) tablet, Take 1 tablet by mouth daily, Disp: , Rfl:      Omega-3 Fatty Acids (FISH OIL MAXIMUM STRENGTH PO), , Disp: , Rfl:      ondansetron (ZOFRAN ODT) 4 MG ODT tab, Take 1 tablet (4 mg) by mouth every 8 hours as needed for nausea, Disp: 6 tablet, Rfl: 0     oxyCODONE (ROXICODONE) 5 MG tablet, Take 1 tablet (5 mg) by mouth every 4 hours as needed for severe pain, Disp: 12 tablet, Rfl: 0     rivaroxaban ANTICOAGULANT (XARELTO ANTICOAGULANT) 20 MG TABS tablet, Take 1 tablet (20 mg) by mouth daily (with dinner), Disp: , Rfl:      sertraline (ZOLOFT) 50 MG tablet, Take 50 mg by mouth At Bedtime, Disp: , Rfl:      vitamin B complex with vitamin C (STRESS TAB) tablet, Take 1 tablet by mouth daily, Disp: , Rfl:      vitamin E 400 units TABS, Take 400 Units by mouth daily, Disp: , Rfl:     Allergies  Allergies   Allergen Reactions     Lisinopril Cough       Social History  Social History     Socioeconomic History     Marital status:    Tobacco Use     Smoking status: Never     Smokeless tobacco: Never   Vaping Use     Vaping status: Never Used   Substance and Sexual Activity     Alcohol use: Yes     Comment: very rare use     Drug use: No       Family History  History reviewed. No pertinent family history.    Review of Systems  As per HPI and PMHx, otherwise 10 system review including the head and neck, constitutional, eyes, respiratory, GI, skin, neurologic, lymphatic, endocrine, and allergy systems is negative.    Physical Exam  BP (!) 144/86 (BP Location: Right arm, Patient Position: Sitting, Cuff Size: Adult Large)    Pulse 79   Wt 104.3 kg (230 lb)   SpO2 96%   BMI 39.48 kg/m    GENERAL: Patient is a pleasant, cooperative 73 year old female in no acute distress.  HEAD: Normocephalic, atraumatic.  Hair and scalp are normal.  EYES: Pupils are equal, round, reactive to light and accommodation.  Extraocular movements are intact.  The sclera nonicteric without injection.  The extraocular structures are normal.  EARS: Normal shape and symmetry.  No tenderness when palpating the mastoid or tragal areas bilaterally.    NOSE: The nasal cast is in place.  Adhesive remover was placed on the nose and the cast was removed.  Kwong splints are in good placement.  The stitch and Kwong splints are removed.  The nasal septum is midline without any evidence of septal hematoma.  The inferior turbinates are well lateralized.  The bilateral nasal cavities were suctioned free.  The patient noticed immediate improvement in the breathing.  The patient tolerated the procedure well.  NEUROLOGIC: Cranial nerves II through XII are grossly intact.  Voice is strong.  Patient is House-Brackman I/VI bilaterally.  CARDIOVASCULAR: Extremities are warm and well-perfused.  No significant peripheral edema.  RESPIRATORY: Patient has nonlabored breathing without cough, wheeze, stridor.  PSYCHIATRIC: Patient is alert and oriented.  Mood and affect appear normal.  SKIN: Warm and dry.  No scalp, face, or neck lesions noted.    Assessment and Plan     ICD-10-CM    1. Closed fracture of nasal bone with routine healing, subsequent encounter  S02.2XXD       2. Closed fracture of nasal septum with routine healing, subsequent encounter  S02.2XXD       3. Hematoma of nasal septum, subsequent encounter  S00.33XD       4. Status post nasal surgery  Z98.890       5. Postoperative state  Z98.890       6. Facial trauma, initial encounter  S09.93XA Adult ENT  Referral      7. Closed fracture of nasal bone, initial encounter  S02.2XXA Adult ENT  Referral      8.  Hematoma of nasal septum, initial encounter  S00.33XA Adult ENT  Referral      9. Abnormal CT scan  R93.89 Adult ENT  Referral    1.  Redemonstration of the comminuted, impacted nasal bone fractures with involvement of the osseous nasal septum. Increased soft tissue tissue fullness of the cartilaginous nasal septum could represent a nasal septal hematoma.         It was my pleasure seeing Micki RITCHIE Mathew today in clinic.  The patient is healing well after her closed nasal reduction with incision and drainage of her septal hematoma.  We discussed an additional 1 week of nose blowing and activity restrictions.  We will have them start nasal saline irrigation.  They can return to use of nasal sprays.  I would like to see the patient back in 2 to 3 months for recheck.  Patient knows to contact me sooner with any problems or concerns.    Micki to follow up with Primary Care provider regarding elevated blood pressure.    Brent Ibarra MD  Department of Otolaryngology-Head and Neck Surgery  [facility]

## 2023-04-14 ENCOUNTER — OFFICE VISIT (OUTPATIENT)
Dept: OTOLARYNGOLOGY | Facility: CLINIC | Age: 73
End: 2023-04-14
Payer: COMMERCIAL

## 2023-04-14 VITALS
DIASTOLIC BLOOD PRESSURE: 86 MMHG | BODY MASS INDEX: 39.48 KG/M2 | HEART RATE: 79 BPM | OXYGEN SATURATION: 96 % | SYSTOLIC BLOOD PRESSURE: 144 MMHG | WEIGHT: 230 LBS

## 2023-04-14 DIAGNOSIS — S00.33XA HEMATOMA OF NASAL SEPTUM, INITIAL ENCOUNTER: ICD-10-CM

## 2023-04-14 DIAGNOSIS — Z98.890 POSTOPERATIVE STATE: ICD-10-CM

## 2023-04-14 DIAGNOSIS — R93.89 ABNORMAL CT SCAN: ICD-10-CM

## 2023-04-14 DIAGNOSIS — S09.93XA FACIAL TRAUMA, INITIAL ENCOUNTER: ICD-10-CM

## 2023-04-14 DIAGNOSIS — S02.2XXD CLOSED FRACTURE OF NASAL BONE WITH ROUTINE HEALING, SUBSEQUENT ENCOUNTER: Primary | ICD-10-CM

## 2023-04-14 DIAGNOSIS — Z98.890 STATUS POST NASAL SURGERY: ICD-10-CM

## 2023-04-14 DIAGNOSIS — S02.2XXA CLOSED FRACTURE OF NASAL BONE, INITIAL ENCOUNTER: ICD-10-CM

## 2023-04-14 DIAGNOSIS — S00.33XD HEMATOMA OF NASAL SEPTUM, SUBSEQUENT ENCOUNTER: ICD-10-CM

## 2023-04-14 DIAGNOSIS — S02.2XXD CLOSED FRACTURE OF NASAL SEPTUM WITH ROUTINE HEALING, SUBSEQUENT ENCOUNTER: ICD-10-CM

## 2023-04-14 PROCEDURE — 99024 POSTOP FOLLOW-UP VISIT: CPT | Performed by: OTOLARYNGOLOGY

## 2023-04-14 NOTE — NURSING NOTE
Chief Complaint   Patient presents with     Ent Problem     Recheck nose       Vitals:    04/14/23 1522   BP: (!) 144/86   BP Location: Right arm   Patient Position: Sitting   Cuff Size: Adult Large   Pulse: 79   SpO2: 96%   Weight: 104.3 kg (230 lb)     Wt Readings from Last 1 Encounters:   04/14/23 104.3 kg (230 lb)       Rosalie Vera MA

## 2023-04-14 NOTE — LETTER
4/14/2023         RE: Micki Mathew  [address]  [facility] [address]        Dear Colleague,    Thank you for referring your patient, Micki Mathew, to the [facility]. Please see a copy of my visit note below.    Chief Complaint   Patient presents with     Ent Problem     Recheck nose     History of Present Illness  Micki Mathew is a 73 year old female who presents today for follow-up.  The patient sustained facial trauma secondary to fall on 4/4/2023.  She was on anticoagulation and had significant epistaxis.  She developed a significant nasal fracture and septal hematoma.  She had persistent bleeding and represented to the emergency department on 4/7/2023.  She was taken to the operating room on 4/8/2023 for closed reduction of nasal fracture and incision and drainage of her septal hematoma with placement of nasal splints.      The patient has done well postoperatively with minimal pain, unless he manipulates the nose which is tender.  The patient does report some congestion but no significant nasal bleeding.  They presents today for nasal splint removal.    Past Medical History  Patient Active Problem List   Diagnosis     Epistaxis     Facial trauma, initial encounter     Head injury, initial encounter     Closed fracture of nasal bone with routine healing     Essential hypertension     History of depression     Hypothyroidism     Hyperlipidemia     Paroxysmal atrial fibrillation (H)     Current Medications    Current Outpatient Medications:      Ascorbic Acid (VITAMIN C PO), , Disp: , Rfl:      ASPIRIN ADULT LOW STRENGTH PO, Take 81 mg by mouth daily, Disp: , Rfl:      atorvastatin (LIPITOR) 20 MG tablet, Take 20 mg by mouth daily, Disp: , Rfl:      Calcium-Magnesium-Vitamin D (CALCIUM 500 PO), , Disp: , Rfl:      Cholecalciferol (VITAMIN D-3 PO), , Disp: , Rfl:      FOLIC ACID PO, Take 1 mg by mouth daily, Disp: , Rfl:      glucosamine-chondroitin 500-400 MG CAPS  per capsule, Take 1 capsule by mouth daily, Disp: , Rfl:      hydrochlorothiazide (HYDRODIURIL) 25 MG tablet, Take 1 tablet by mouth daily (with dinner), Disp: , Rfl:      levothyroxine (SYNTHROID/LEVOTHROID) 75 MCG tablet, Take 75 mcg by mouth daily, Disp: , Rfl:      magnesium 250 MG tablet, Take 1 tablet by mouth daily, Disp: , Rfl:      metoprolol tartrate (LOPRESSOR) 25 MG tablet, Take 25 mg by mouth 2 times daily Take with 50mg total 75 mg twice daily, Disp: , Rfl:      metoprolol tartrate (LOPRESSOR) 50 MG tablet, Take 50 mg by mouth 2 times daily Take with 25 mg total 75 mg twice daily, Disp: , Rfl:      multivitamin w/minerals (THERA-VIT-M) tablet, Take 1 tablet by mouth daily, Disp: , Rfl:      Omega-3 Fatty Acids (FISH OIL MAXIMUM STRENGTH PO), , Disp: , Rfl:      ondansetron (ZOFRAN ODT) 4 MG ODT tab, Take 1 tablet (4 mg) by mouth every 8 hours as needed for nausea, Disp: 6 tablet, Rfl: 0     oxyCODONE (ROXICODONE) 5 MG tablet, Take 1 tablet (5 mg) by mouth every 4 hours as needed for severe pain, Disp: 12 tablet, Rfl: 0     rivaroxaban ANTICOAGULANT (XARELTO ANTICOAGULANT) 20 MG TABS tablet, Take 1 tablet (20 mg) by mouth daily (with dinner), Disp: , Rfl:      sertraline (ZOLOFT) 50 MG tablet, Take 50 mg by mouth At Bedtime, Disp: , Rfl:      vitamin B complex with vitamin C (STRESS TAB) tablet, Take 1 tablet by mouth daily, Disp: , Rfl:      vitamin E 400 units TABS, Take 400 Units by mouth daily, Disp: , Rfl:     Allergies  Allergies   Allergen Reactions     Lisinopril Cough       Social History  Social History     Socioeconomic History     Marital status:    Tobacco Use     Smoking status: Never     Smokeless tobacco: Never   Vaping Use     Vaping status: Never Used   Substance and Sexual Activity     Alcohol use: Yes     Comment: very rare use     Drug use: No       Family History  History reviewed. No pertinent family history.    Review of Systems  As per HPI and PMHx, otherwise 10 system  review including the head and neck, constitutional, eyes, respiratory, GI, skin, neurologic, lymphatic, endocrine, and allergy systems is negative.    Physical Exam  BP (!) 144/86 (BP Location: Right arm, Patient Position: Sitting, Cuff Size: Adult Large)   Pulse 79   Wt 104.3 kg (230 lb)   SpO2 96%   BMI 39.48 kg/m    GENERAL: Patient is a pleasant, cooperative 73 year old female in no acute distress.  HEAD: Normocephalic, atraumatic.  Hair and scalp are normal.  EYES: Pupils are equal, round, reactive to light and accommodation.  Extraocular movements are intact.  The sclera nonicteric without injection.  The extraocular structures are normal.  EARS: Normal shape and symmetry.  No tenderness when palpating the mastoid or tragal areas bilaterally.    NOSE: The nasal cast is in place.  Adhesive remover was placed on the nose and the cast was removed.  Kwong splints are in good placement.  The stitch and Kwong splints are removed.  The nasal septum is midline without any evidence of septal hematoma.  The inferior turbinates are well lateralized.  The bilateral nasal cavities were suctioned free.  The patient noticed immediate improvement in the breathing.  The patient tolerated the procedure well.  NEUROLOGIC: Cranial nerves II through XII are grossly intact.  Voice is strong.  Patient is House-Brackman I/VI bilaterally.  CARDIOVASCULAR: Extremities are warm and well-perfused.  No significant peripheral edema.  RESPIRATORY: Patient has nonlabored breathing without cough, wheeze, stridor.  PSYCHIATRIC: Patient is alert and oriented.  Mood and affect appear normal.  SKIN: Warm and dry.  No scalp, face, or neck lesions noted.    Assessment and Plan     ICD-10-CM    1. Closed fracture of nasal bone with routine healing, subsequent encounter  S02.2XXD       2. Closed fracture of nasal septum with routine healing, subsequent encounter  S02.2XXD       3. Hematoma of nasal septum, subsequent encounter  S00.33XD       4.  Status post nasal surgery  Z98.890       5. Postoperative state  Z98.890       6. Facial trauma, initial encounter  S09.93XA Adult ENT  Referral      7. Closed fracture of nasal bone, initial encounter  S02.2XXA Adult ENT  Referral      8. Hematoma of nasal septum, initial encounter  S00.33XA Adult ENT  Referral      9. Abnormal CT scan  R93.89 Adult ENT  Referral    1.  Redemonstration of the comminuted, impacted nasal bone fractures with involvement of the osseous nasal septum. Increased soft tissue tissue fullness of the cartilaginous nasal septum could represent a nasal septal hematoma.         It was my pleasure seeing Micki DUGAN Quincy today in clinic.  The patient is healing well after her closed nasal reduction with incision and drainage of her septal hematoma.  We discussed an additional 1 week of nose blowing and activity restrictions.  We will have them start nasal saline irrigation.  They can return to use of nasal sprays.  I would like to see the patient back in 2 to 3 months for recheck.  Patient knows to contact me sooner with any problems or concerns.    Micki to follow up with Primary Care provider regarding elevated blood pressure.    Brent Ibarra MD  Department of Otolaryngology-Head and Neck Surgery  [facility]         Again, thank you for allowing me to participate in the care of your patient.        Sincerely,        Brent Ibarra MD

## 2023-04-14 NOTE — PATIENT INSTRUCTIONS
NASAL SALINE IRRIGATION INSTRUCTIONS    You will be starting nasal saline irrigations and will need to obtain the following:      - NeilMed Sinus Rinse 8 oz Kit  - Distilled or filtered water   - Normal saline salt packets    Place filtered or distilled water into the NeilMed bottle up to the fill line (DO NOT USE TAP OR WELL WATER).  Place the pre-made salt packet in the 8 oz of saline.  Shake the bottle to suspend into solution.  Lean head forward over a sink or a basin.  Rinse each side of the nose with one-half of the bottle (each squeeze is about one-half of the bottle). Rinse the nose daily.     If you use topical nasal sprays, apply following irrigation.    Video example: https://www.youServiceFrame.com/watch?v=[email or web address]

## 2023-04-15 ENCOUNTER — HEALTH MAINTENANCE LETTER (OUTPATIENT)
Age: 73
End: 2023-04-15

## 2023-09-14 ENCOUNTER — TELEPHONE (OUTPATIENT)
Dept: FAMILY MEDICINE | Facility: CLINIC | Age: 73
End: 2023-09-14
Payer: COMMERCIAL

## 2023-09-14 NOTE — LETTER
September 14, 2023    Micki Mathew  [address]   [place] [address]    Dear Micki,    At [facility] we care about your health and are committed to providing quality patient care.     Here is a list of Health Maintenance topics that are due now or due soon:  Health Maintenance Due   Topic Date Due    DEXA  Never done    TSH W/FREE T4 REFLEX  Never done    ANNUAL REVIEW OF HM ORDERS  Never done    ADVANCE CARE PLANNING  Never done    HEPATITIS C SCREENING  Never done    LIPID  Never done    MAMMO SCREENING  02/26/2004    ZOSTER IMMUNIZATION (2 of 3) 11/24/2014    FALL RISK ASSESSMENT  Never done    COLORECTAL CANCER SCREENING  08/16/2019    MEDICARE ANNUAL WELLNESS VISIT  12/28/2022    COVID-19 Vaccine (5 - Additional dose for Jacquelyn series) 02/13/2023    INFLUENZA VACCINE (1) 09/01/2023        We are recommending that you:  Schedule a WELLNESS (Preventative/Physical) APPOINTMENT with your primary care provider. If you go elsewhere for your wellness appointments then please disregard this reminder    ,   Schedule a MAMMOGRAM which is due.    1 in 8 women will develop invasive breast cancer during her lifetime and it is the most common non-skin cancer in American women.  EARLY detection, new treatments, and a better understanding of the disease have increased survival rates - the 5 year survival rate in the 1960s was 63% and today it is close to 90%.    If you are under/uninsured, we recommend you contact the [facility]. They offer mammograms at no charge or on a sliding fee charge. You can schedule with them at [phone]. Please have them send us the results.      Please disregard this reminder if you have had this exam elsewhere within the last year.  It would be helpful for us to have a copy of your mammogram report in your file so that we can best coordinate your care - please contact us with when your test was done so we can update your record.    ,    Schedule a COLONOSCOPY to assess for colon cancer (due every 10 years or 5 years in higher risk situations.)       Colon cancer is now the second leading cause of cancer-related deaths in the [place] for both men and women and there are over 130,000 new cases and 50,000 deaths per year from colon cancer.  Colonoscopies can prevent 90-95% of these deaths.  Problem lesions can be removed before they ever become cancer.  This test is not only looking for cancer, but also getting rid of precancerious lesions.    If you are under/uninsured, we recommend you contact the [facility] program. [facility] is a free colorectal cancer screening program that provides colonoscopies for eligible under/uninsured [place] men and women. If you are interested in receiving a free colonoscopy, please call [facility] at [phone] (mention code [employer]) to see if you re eligible.     If you do not wish to do a colonoscopy or cannot afford to do one, at this time, there is another option. It is called a FIT test or Fecal Immunochemical Occult Blood Test (take home stool sample kit).  It does not replace the colonoscopy for colorectal cancer screening, but it can detect hidden bleeding in the lower colon.  It does need to be repeated every year and if a positive result is obtained, you would be referred for a colonoscopy.    If you have completed either one of these tests at another facility, please call/respond to this message with the details of when and where the tests were done and if they were normal or not. Or have the records sent to our clinic so that we can best coordinate your care., and   Schedule a Nurse-Only appointment to update your immunizations: Your records indicate that you are not up to date with your immunizations, please schedule a nurse-only appointment to get these updated or update them at your next office visit. If this is incorrect, please disregard.    To schedule an appointment or discuss this  further, you may contact us by phone at the [facility] at [phone] or online through the [email or web address] @ [email or web address]    Thank you for trusting [facility] and we appreciate the opportunity to serve you.  We look forward to supporting your healthcare needs in the future.    Your partners in health,      Quality Committee at [facility]

## 2023-09-14 NOTE — TELEPHONE ENCOUNTER
Patient Quality Outreach    Patient is due for the following:   Colon Cancer Screening  Breast Cancer Screening - Mammogram  Physical Annual Wellness Visit      Topic Date Due    Zoster (Shingles) Vaccine (2 of 3) 11/24/2014    COVID-19 Vaccine (5 - Additional dose for Jacquelyn series) 02/13/2023    Flu Vaccine (1) 09/01/2023       Next Steps:   Schedule a Annual Wellness Visit    Type of outreach:    Sent letter.    Next Steps:  Reach out within 90 days via Letter.    Max number of attempts reached: Yes. Will try again in 90 days if patient still on fail list.    Questions for provider review:    None           Iain Cid MA  Chart routed to Provider.

## 2023-12-01 NOTE — ED NOTES
Lucy, daughter, called with update [phone]  
Pt's nose started bleeding and pt taken to room 5.  
ADL retraining/balance training/bed mobility training/fine motor coordination training/ROM/strengthening/transfer training

## 2023-12-22 ENCOUNTER — TELEPHONE (OUTPATIENT)
Dept: FAMILY MEDICINE | Facility: CLINIC | Age: 73
End: 2023-12-22
Payer: COMMERCIAL

## 2023-12-22 NOTE — LETTER
[facility]  [address] [facility] [address] [phone]  Dept: [phone]    December 22, 2023    Micki Mathew  [address]   [place] [address]    Dear Micki,    At [facility] we care about your health and are committed to providing quality patient care.     Here is a list of Health Maintenance topics that are due now or due soon:  Health Maintenance Due   Topic Date Due    DEXA  Never done    TSH W/FREE T4 REFLEX  Never done    ANNUAL REVIEW OF HM ORDERS  Never done    ADVANCE CARE PLANNING  Never done    HEPATITIS C SCREENING  Never done    LIPID  Never done    MAMMO SCREENING  02/26/2004    RSV VACCINE (Pregnancy & 60+) (1 - 1-dose 60+ series) Never done    ZOSTER IMMUNIZATION (2 of 3) 11/24/2014    FALL RISK ASSESSMENT  Never done    COLORECTAL CANCER SCREENING  08/16/2019    MEDICARE ANNUAL WELLNESS VISIT  12/28/2022    INFLUENZA VACCINE (1) 09/01/2023    COVID-19 Vaccine (5 - 2023-24 season) 09/01/2023        We are recommending that you:  Schedule a WELLNESS (Preventative/Physical) APPOINTMENT with your primary care provider. If you go elsewhere for your wellness appointments then please disregard this reminder    ,   Schedule a MAMMOGRAM which is due.    1 in 8 women will develop invasive breast cancer during her lifetime and it is the most common non-skin cancer in [place] women.  EARLY detection, new treatments, and a better understanding of the disease have increased survival rates - the 5 year survival rate in the 1960s was 63% and today it is close to 90%.    If you are under/uninsured, we recommend you contact the [facility]. They offer mammograms at no charge or on a sliding fee charge. You can schedule with them at [phone]. Please have them send us the results.      Please disregard this reminder if you have had this exam elsewhere within the last year.  It would be helpful for us to have a copy of  your mammogram report in your file so that we can best coordinate your care - please contact us with when your test was done so we can update your record.    ,   Schedule a COLONOSCOPY to assess for colon cancer (due every 10 years or 5 years in higher risk situations.)       Colon cancer is now the second leading cause of cancer-related deaths in the [place] for both men and women and there are over 130,000 new cases and 50,000 deaths per year from colon cancer.  Colonoscopies can prevent 90-95% of these deaths.  Problem lesions can be removed before they ever become cancer.  This test is not only looking for cancer, but also getting rid of precancerious lesions.    If you are under/uninsured, we recommend you contact the [facility] program. [facility] is a free colorectal cancer screening program that provides colonoscopies for eligible under/uninsured [place] men and women. If you are interested in receiving a free colonoscopy, please call [facility] at [phone] (mention code [employer]) to see if you re eligible.     If you do not wish to do a colonoscopy or cannot afford to do one, at this time, there is another option. It is called a FIT test or Fecal Immunochemical Occult Blood Test (take home stool sample kit).  It does not replace the colonoscopy for colorectal cancer screening, but it can detect hidden bleeding in the lower colon.  It does need to be repeated every year and if a positive result is obtained, you would be referred for a colonoscopy.    If you have completed either one of these tests at another facility, please call/respond to this message with the details of when and where the tests were done and if they were normal or not. Or have the records sent to our clinic so that we can best coordinate your care.,   Hyptertension: If you have a home blood pressure monitor, please respond to this message with your latest home blood pressure reading. If you do not, please schedule a free  blood pressure check with our clinic.    , and   Schedule a Nurse-Only appointment to update your immunizations: Your records indicate that you are not up to date with your immunizations, please schedule a nurse-only appointment to get these updated or update them at your next office visit. If this is incorrect, please disregard.    To schedule an appointment or discuss this further, you may contact us by phone at the [facility] at [phone] or online through the patient [email or web address] @ [email or web address]    Thank you for trusting [facility] and we appreciate the opportunity to serve you.  We look forward to supporting your healthcare needs in the future.    Your partners in health,      Quality Committee at [facility]

## 2023-12-22 NOTE — TELEPHONE ENCOUNTER
Patient Quality Outreach    Patient is due for the following:   Hypertension -  BP check  Colon Cancer Screening  Breast Cancer Screening - Mammogram  Physical Annual Wellness Visit      Topic Date Due    Zoster (Shingles) Vaccine (2 of 3) 11/24/2014    Flu Vaccine (1) 09/01/2023    COVID-19 Vaccine (5 - 2023-24 season) 09/01/2023       Next Steps:   Schedule a nurse only visit for Annual Wellness Visit, Colon Cancer Screening, Breast Cancer Screening, High Blood Pressure, and Immunizations    Type of outreach:    Sent letter.      Questions for provider review:    None           Roro Hubbard

## 2024-04-10 ENCOUNTER — TELEPHONE (OUTPATIENT)
Dept: FAMILY MEDICINE | Facility: CLINIC | Age: 74
End: 2024-04-10
Payer: COMMERCIAL

## 2024-04-10 NOTE — TELEPHONE ENCOUNTER
Patient Quality Outreach    Patient is due for the following:   Colon Cancer Screening  Breast Cancer Screening - Mammogram  Physical Annual Wellness Visit      Topic Date Due    Zoster (Shingles) Vaccine (2 of 3) 11/24/2014    Flu Vaccine (1) 09/01/2023    COVID-19 Vaccine (5 - 2023-24 season) 09/01/2023       Next Steps:   Schedule a Annual Wellness Visit    Type of outreach:    Sent Liibook message.      Questions for provider review:    None           Chrissy Rick

## 2024-06-16 ENCOUNTER — HEALTH MAINTENANCE LETTER (OUTPATIENT)
Age: 74
End: 2024-06-16

## 2024-07-03 ENCOUNTER — TELEPHONE (OUTPATIENT)
Dept: FAMILY MEDICINE | Facility: CLINIC | Age: 74
End: 2024-07-03
Payer: COMMERCIAL

## 2024-07-03 NOTE — TELEPHONE ENCOUNTER
Patient Quality Outreach    Patient is due for the following:   Colon Cancer Screening  Breast Cancer Screening - Mammogram  Physical Annual Wellness Visit      Topic Date Due    Zoster (Shingles) Vaccine (2 of 3) 11/24/2014    COVID-19 Vaccine (5 - 2023-24 season) 09/01/2023       Next Steps:   Schedule a Annual Wellness Visit    Type of outreach:    Sent [employer] message.      Questions for provider review:    None           Chrissy Rick

## 2024-10-09 ENCOUNTER — TELEPHONE (OUTPATIENT)
Dept: FAMILY MEDICINE | Facility: CLINIC | Age: 74
End: 2024-10-09
Payer: COMMERCIAL

## 2024-10-09 NOTE — TELEPHONE ENCOUNTER
Patient Quality Outreach    Patient is due for the following:   Hypertension -  Hypertension follow-up visit  Colon Cancer Screening  Breast Cancer Screening - Mammogram  Depression  -    Physical Annual Wellness Visit      Topic Date Due    Zoster (Shingles) Vaccine (2 of 3) 11/24/2014    Flu Vaccine (1) 09/01/2024    COVID-19 Vaccine (5 - 2024-25 season) 09/01/2024       Next Steps:   Schedule a Annual Wellness Visit    Type of outreach:    Sent [employer] message.      Questions for provider review:    None           Chrissy Rick

## 2025-01-21 ENCOUNTER — TELEPHONE (OUTPATIENT)
Dept: FAMILY MEDICINE | Facility: CLINIC | Age: 75
End: 2025-01-21
Payer: COMMERCIAL

## 2025-01-21 NOTE — TELEPHONE ENCOUNTER
Patient Quality Outreach    Patient is due for the following:   Colon Cancer Screening  Breast Cancer Screening - Mammogram  Physical Annual Wellness Visit      Topic Date Due    Zoster (Shingles) Vaccine (2 of 3) 11/24/2014    Flu Vaccine (1) 09/01/2024    COVID-19 Vaccine (5 - 2024-25 season) 09/01/2024       Action(s) Taken:   Schedule a Annual Wellness Visit    Type of outreach:    Sent [employer] message.    Questions for provider review:    None           Chrissy Rick

## 2025-06-21 ENCOUNTER — HEALTH MAINTENANCE LETTER (OUTPATIENT)
Age: 75
End: 2025-06-21

## (undated) DEVICE — GLOVE BIOGEL PI ULTRATOUCH G SZ 7.5 42175

## (undated) DEVICE — SU ETHILON 3-0 PS-2 18" 1669H

## (undated) DEVICE — LABEL MEDICATION SYSTEM  3304

## (undated) DEVICE — SPONGE COTTONOID 1/2X3" 80-1407

## (undated) DEVICE — Device

## (undated) DEVICE — DRSG STERI STRIP 1/2X4" R1547

## (undated) DEVICE — ADH LIQUID MASTISOL TOPICAL VIAL 2-3ML 0523-48

## (undated) DEVICE — DRSG TELFA 2X3"

## (undated) DEVICE — SPLINT NASAL DENVER SM/MED 1500 SERIES 10-1500-05KS

## (undated) RX ORDER — DEXAMETHASONE SODIUM PHOSPHATE 4 MG/ML
INJECTION, SOLUTION INTRA-ARTICULAR; INTRALESIONAL; INTRAMUSCULAR; INTRAVENOUS; SOFT TISSUE
Status: DISPENSED
Start: 2023-04-08

## (undated) RX ORDER — GINSENG 100 MG
CAPSULE ORAL
Status: DISPENSED
Start: 2023-04-08

## (undated) RX ORDER — EPINEPHRINE 1 MG/ML
INJECTION, SOLUTION, CONCENTRATE INTRAVENOUS
Status: DISPENSED
Start: 2023-04-08

## (undated) RX ORDER — FENTANYL CITRATE 50 UG/ML
INJECTION, SOLUTION INTRAMUSCULAR; INTRAVENOUS
Status: DISPENSED
Start: 2018-08-16

## (undated) RX ORDER — FENTANYL CITRATE 50 UG/ML
INJECTION, SOLUTION INTRAMUSCULAR; INTRAVENOUS
Status: DISPENSED
Start: 2017-04-21

## (undated) RX ORDER — DEXAMETHASONE SODIUM PHOSPHATE 10 MG/ML
INJECTION, SOLUTION INTRAMUSCULAR; INTRAVENOUS
Status: DISPENSED
Start: 2023-04-08

## (undated) RX ORDER — FENTANYL CITRATE 50 UG/ML
INJECTION, SOLUTION INTRAMUSCULAR; INTRAVENOUS
Status: DISPENSED
Start: 2023-04-08

## (undated) RX ORDER — ONDANSETRON 2 MG/ML
INJECTION INTRAMUSCULAR; INTRAVENOUS
Status: DISPENSED
Start: 2023-04-08

## (undated) RX ORDER — OXYMETAZOLINE HYDROCHLORIDE 0.05 G/100ML
SPRAY NASAL
Status: DISPENSED
Start: 2023-04-08

## (undated) RX ORDER — LIDOCAINE HYDROCHLORIDE 10 MG/ML
INJECTION, SOLUTION EPIDURAL; INFILTRATION; INTRACAUDAL; PERINEURAL
Status: DISPENSED
Start: 2023-04-08

## (undated) RX ORDER — PROPOFOL 10 MG/ML
INJECTION, EMULSION INTRAVENOUS
Status: DISPENSED
Start: 2023-04-08

## (undated) RX ORDER — EPHEDRINE SULFATE 50 MG/ML
INJECTION, SOLUTION INTRAMUSCULAR; INTRAVENOUS; SUBCUTANEOUS
Status: DISPENSED
Start: 2023-04-08

## (undated) RX ORDER — CEFAZOLIN SODIUM 1 G/3ML
INJECTION, POWDER, FOR SOLUTION INTRAMUSCULAR; INTRAVENOUS
Status: DISPENSED
Start: 2023-04-08